# Patient Record
Sex: MALE | Race: WHITE | Employment: OTHER | URBAN - METROPOLITAN AREA
[De-identification: names, ages, dates, MRNs, and addresses within clinical notes are randomized per-mention and may not be internally consistent; named-entity substitution may affect disease eponyms.]

---

## 2017-01-14 RX ORDER — ATORVASTATIN CALCIUM 80 MG/1
TABLET, FILM COATED ORAL
Qty: 90 TAB | Refills: 3 | Status: SHIPPED | OUTPATIENT
Start: 2017-01-14 | End: 2018-03-14 | Stop reason: SDUPTHER

## 2017-01-31 ENCOUNTER — OFFICE VISIT (OUTPATIENT)
Dept: CARDIOLOGY CLINIC | Age: 77
End: 2017-01-31

## 2017-01-31 VITALS
SYSTOLIC BLOOD PRESSURE: 130 MMHG | DIASTOLIC BLOOD PRESSURE: 64 MMHG | HEART RATE: 48 BPM | WEIGHT: 167 LBS | HEIGHT: 74 IN | OXYGEN SATURATION: 97 % | BODY MASS INDEX: 21.43 KG/M2

## 2017-01-31 DIAGNOSIS — E78.5 DYSLIPIDEMIA: ICD-10-CM

## 2017-01-31 DIAGNOSIS — G45.1 HEMISPHERIC CAROTID ARTERY SYNDROME: ICD-10-CM

## 2017-01-31 DIAGNOSIS — I99.9 VASCULAR DISEASE: ICD-10-CM

## 2017-01-31 DIAGNOSIS — I25.10 ATHEROSCLEROSIS OF NATIVE CORONARY ARTERY OF NATIVE HEART WITHOUT ANGINA PECTORIS: ICD-10-CM

## 2017-01-31 DIAGNOSIS — I49.1 PAC (PREMATURE ATRIAL CONTRACTION): ICD-10-CM

## 2017-01-31 DIAGNOSIS — R00.1 SINUS BRADYCARDIA, PERSISTENT: Primary | ICD-10-CM

## 2017-01-31 NOTE — MR AVS SNAPSHOT
Visit Information Date & Time Provider Department Dept. Phone Encounter #  
 1/31/2017 10:20 AM Steve Longoria DO [facility] [id number] Follow-up Instructions Return in about 6 months (around 7/31/2017), or if symptoms worsen or fail to improve. Upcoming Health Maintenance Date Due DTaP/Tdap/Td series (1 - Tdap) 2/7/1960 ZOSTER VACCINE AGE 60> 2/7/1999 GLAUCOMA SCREENING Q2Y 2/7/2004 Pneumococcal 65+ High/Highest Risk (1 of 2 - PCV13) 2/7/2004 MEDICARE YEARLY EXAM 2/7/2004 INFLUENZA AGE 9 TO ADULT 8/1/2016 Allergies as of 1/31/2017  Review Complete On: 1/31/2017 By: Steve Longoria DO No Known Allergies Current Immunizations  Never Reviewed No immunizations on file. Not reviewed this visit You Were Diagnosed With   
  
 Codes Comments Sinus bradycardia, persistent (HCC)    -  Primary ICD-10-CM: R00.1 ICD-9-CM: 427.81 Dyslipidemia     ICD-10-CM: E78.5 ICD-9-CM: 272.4 Atherosclerosis of native coronary artery of native heart without angina pectoris     ICD-10-CM: I25.10 ICD-9-CM: 414.01   
 PAC (premature atrial contraction)     ICD-10-CM: I49.1 ICD-9-CM: 427.61 Hemispheric carotid artery syndrome     ICD-10-CM: G45.1 ICD-9-CM: 435.8 Vascular disease     ICD-10-CM: I99.9 ICD-9-CM: 459.9 Vitals BP Pulse Height(growth percentile) Weight(growth percentile) SpO2 BMI  
 130/64 (!) 48 6' 2\" (1.88 m) 167 lb (75.8 kg) 97% 21.44 kg/m2 Smoking Status Never Smoker Vitals History BMI and BSA Data Body Mass Index Body Surface Area  
 21.44 kg/m 2 1.99 m 2 Preferred Pharmacy Pharmacy Name Phone [address], [address] [place] [address] [phone] Your Updated Medication List  
  
   
This list is accurate as of: 1/31/17 10:48 AM.  Always use your most recent med list.  
  
  
  
  
 aspirin delayed-release 81 mg tablet Take 1 Tab by mouth daily. atorvastatin 80 mg tablet Commonly known as:  LIPITOR  
TAKE 1 TABLET BY MOUTH ONCE DAILY  
  
 cholecalciferol (VITAMIN D3) 5,000 unit Tab tablet Commonly known as:  VITAMIN D3 Take 1 Tab by mouth daily. co-enzyme Q-10 100 mg capsule Commonly known as:  CO Q-10 Take 100 mg by mouth daily. HYDROcodone-acetaminophen 7.5-325 mg per tablet Commonly known as:  Birda Monona Take 1 Tab by mouth every four (4) hours as needed. Max Daily Amount: 6 Tabs. Indications: PAIN  
  
 hydrocortisone 2.5 % topical cream  
Commonly known as:  HYTONE Apply  to affected area two (2) times daily as needed. ketoconazole 2 % topical cream  
Commonly known as:  NIZORAL Apply  to affected area daily. losartan 25 mg tablet Commonly known as:  COZAAR Take 25 mg by mouth daily. * nitroglycerin 0.4 mg SL tablet Commonly known as:  NITROSTAT  
0.4 mg by SubLINGual route every five (5) minutes as needed. * nitroglycerin 0.4 mg SL tablet Commonly known as:  NITROSTAT  
1 Tab by SubLINGual route as needed for Chest Pain (may repeat in 5 minutes time 2 if pain continues. ). VITAMIN B-12 1,000 mcg tablet Generic drug:  cyanocobalamin Take 1,000 mcg by mouth daily. VITAMIN C 500 mg tablet Generic drug:  ascorbic acid (vitamin C) Take 500 mg by mouth daily. * Notice: This list has 2 medication(s) that are the same as other medications prescribed for you. Read the directions carefully, and ask your doctor or other care provider to review them with you. We Performed the Following AMB POC EKG ROUTINE W/ 12 LEADS, INTER & REP [72316 CPT(R)] Follow-up Instructions Return in about 6 months (around 7/31/2017), or if symptoms worsen or fail to improve. Introducing hospitals & HEALTH SERVICES!    
 [facility] introduces [employer] patient portal. Now you can access parts of your medical record, email your doctor's office, and request medication refills online. 1. In your internet browser, go to [email or web address] 2. Click on the First Time User? Click Here link in the Sign In box. You will see the New Member Sign Up page. 3. Enter your Tiggly Access Code exactly as it appears below. You will not need to use this code after youve completed the sign-up process. If you do not sign up before the expiration date, you must request a new code. · Tiggly Access Code: [email or web address] Expires: 2/25/2017  1:14 PM 
 
4. Enter the last four digits of your Social Security Number (xxxx) and Date of Birth (mm/dd/yyyy) as indicated and click Submit. You will be taken to the next sign-up page. 5. Create a Tiggly ID. This will be your Tiggly login ID and cannot be changed, so think of one that is secure and easy to remember. 6. Create a Tiggly password. You can change your password at any time. 7. Enter your Password Reset Question and Answer. This can be used at a later time if you forget your password. 8. Enter your e-mail address. You will receive e-mail notification when new information is available in [address] 9. Click Sign Up. You can now view and download portions of your medical record. 10. Click the Download Summary menu link to download a portable copy of your medical information. If you have questions, please visit the Frequently Asked Questions section of the Tiggly website. Remember, Tiggly is NOT to be used for urgent needs. For medical emergencies, dial 911. Now available from your iPhone and Android! Please provide this summary of care documentation to your next provider. Your primary care clinician is listed as HANNAH LACY. If you have any questions after today's visit, please call [phone].

## 2017-01-31 NOTE — PROGRESS NOTES
1. Have you been to the ER, urgent care clinic since your last visit? Hospitalized since your last visit? no    2. Have you seen or consulted any other health care providers outside of the [address] [place] [address] since your last visit? Include any pap smears or colon screening.  no

## 2017-01-31 NOTE — PROGRESS NOTES
HPI: I saw Shagufta Francois Sr., in my office today in cardiovascular evaluation regarding his underlying ischemic heart disease, carotid disease, and hypercholesterolemia. Mr. Martha Mares is a very pleasant 67year old white male with history of coronary artery disease, status post an inferior wall myocardial infarction back in 1998. He had a cardiac catheterization following a positive nuclear myocardial perfusion study back in 2002 and subsequent cardiac catheterization demonstrating a 75% proximal LAD lesion with a 35% circumflex obstruction, 45% OM1 obstruction and a 70% right coronary artery obstruction with ejection fraction of 70%. He has been treated medically over the years for this problem and has done quite well. He did have a TIA due to a totally occluded left carotid back in February of 2005 and we have been following his carotids every several months and his right carotid artery has always remained less than 50% narrowed. He comes into the office today and relates that he is doing very well. He did have a problem with chest pain back in November but it turned out to be related to gallbladder disease with gallstones and he had his gallbladder removed by Dr. Linda Garcia on November 30, 2016 and has done well since that time. Encounter Diagnoses   Name Primary?  Sinus bradycardia, persistent on low dose beta blockade Yes    Dyslipidemia     Atherosclerosis of native coronary artery of native heart without angina pectoris     PAC (premature atrial contraction)     Hemispheric carotid artery syndrome     Vascular disease        Discussion: This gentleman appears to be doing about as well as we could expect and I really have no recommendations for change at this time. He is not having any symptoms to suggest the development of symptomatic obstructive coronary disease or problems with heart failure symptomatology.      His latest lipid profile which was completed on January 24, 2017 showed total cholesterol 122 with triglycerides of 65, HDL of 60, VLDL of 13, and LDL of 49 which I think is excellent control of his cholesterol on Lipitor 80 mg daily. He does have a sinus bradycardia, but is no longer on beta blockade and simply taking Cozaar 25 mg daily for his blood pressure which seems to be well-controlled today and since he is otherwise doing well I will simply plan to see him again in several months or as needed if any new cardiovascular symptoms surface in the interim. PCP:  Fidelia Grace MD       Past Medical History   Diagnosis Date    Atherosclerotic heart disease     Blurred vision, left eye      resolution with cataract surgery    CAD (coronary artery disease)     Calculus of kidney     Carotid duplex ([facility] 12/17/2015     Mild < 50% JAMAL stenosis. Chronic LICA occlusion. Probable significant RECA stenosis. Unchanged from study of 2/9/15.  Carotid stenosis     Chronic kidney disease, stage IV (severe) ([address] 12/11/2012     BUN and Creatinine 28/2.58     CVA (cerebral vascular accident) ([facility] [address] 2/22/05 & 2/23/05     secondary to totally occluded left internal carotid artery    Equivocal nuclear cardiac imaging test 12/29/2015     Low-intermediate risk. Inferobasal artifact vs prior injury. Possible mild inferobasal hypk. EF 53%. Positive EKG on EST. Ex time 8 mins 43 secs.  History of echocardiogram 02/23/2005     EF 50-55%. Basal-mid inferior hypk. Mild MR.    History of heart attack     Holter monitor, normal 05/20/2014     Benign 24-hour Holter study.  Hypercholesteremia     Ischemic heart disease     Myocardial infarction, inferior wall ([address] 1998    S/P cardiac catheterization 09/12/2002     LM patent. pLAD 75%. pCx 35%. OM1 45%. dRCA 70%. EF 50%.  Stroke [facility]) 2/2005    Vascular disease     Visceral arterial duplex 12/21/2012     No significant renal artery stenosis bilaterally. R kidney 10.4 cm. L kidney 10.1 cm.   Bilateral intrinsic/med renal disease. Both renal veins patent.  Vitamin B 12 deficiency      with history of replacement         Past Surgical History   Procedure Laterality Date    Hx coronary stent placement       right coronary artery    Hx heart catheterization  2002    Hx hernia repair      Hx back surgery       L5    Pr spine surgery procedure unlisted       spinal fusion    Hx appendectomy      Hx cataract removal  2009     left    Hx cataract removal  2012     right         Current Outpatient Rx   Name  Route  Sig  Dispense  Refill    ketoconazole (NIZORAL) 2 % topical cream    Topical    Apply  to affected area daily.  hydrocortisone (HYTONE) 2.5 % topical cream    Topical    Apply  to affected area two (2) times daily as needed.  clopidogrel (PLAVIX) 75 mg tablet    Oral    Take 1 Tab by mouth daily. 30 Tab    6       This is in place of the Aggrenox      atorvastatin (LIPITOR) 80 mg tablet    Oral    Take 1 Tab by mouth daily. 90 Tab    3      tadalafil (CIALIS) 5 mg tablet    Oral    Take 5 mg by mouth as needed. 30 Tab    0      cyanocobalamin (VITAMIN B-12) 1,000 mcg tablet    Oral    Take 1,000 mcg by mouth daily.  Cholecalciferol, Vitamin D3, 5,000 unit Tab    Oral    Take 1 Tab by mouth daily.  losartan (COZAAR) 25 mg tablet    Oral    Take 25 mg by mouth daily.  co-enzyme Q-10 (CO Q-10) 100 mg capsule    Oral    Take 100 mg by mouth daily.  nitroglycerin (NITROSTAT) 0.4 mg SL tablet    SubLINGual    0.4 mg by SubLINGual route every five (5) minutes as needed.  ascorbic acid (VITAMIN C) 500 mg tablet    Oral    Take 500 mg by mouth daily.                  No Known Allergies    Social   Social History   Substance Use Topics    Smoking status: Never Smoker    Smokeless tobacco: Never Used    Alcohol use No         Family history: Father  of myocardial infarction at age 66, otherwise no family history of heart disease. Review of Systems:   Constitutional: Negative. Respiratory: Negative. Cardiovascular: Negative. Gastrointestinal: Negative. Musculoskeletal: Negative. Physical Exam:   The patient is an alert, oriented, well developed, well nourished 67 y.o.  male who was in no acute distress at the time of my examination. Visit Vitals    Ht 6' 2\" (1.88 m)      BP Readings from Last 3 Encounters:   11/30/16 159/73   11/27/16 167/74   07/12/16 126/78        Wt Readings from Last 3 Encounters:   11/30/16 72.1 kg (159 lb)   11/26/16 77.1 kg (170 lb)   07/12/16 76.2 kg (168 lb)       HEENT:  Conjuctiva white, mucosa moist, no pallor or cyanosis. Neck: Supple without masses, tenderness or thyromegaly. No jugular venous distention. Carotid upstroke is decreased on the left without bruits bilaterally. Cardiovascular: Chest is symmetrical with good excursion. [place] is not displaced. No lifts, heaves or thrills. S1 and S2 are normal, without appreciable murmurs, rubs, clicks or gallops. Lungs: Clear to auscultation in all fields. Abdomen: Soft. No masses, tenderness or organomegaly. Extremities: No edema, with full peripheral pulses. Review of Data:  Please refer to past medical history for most recent cardiac testing. Results for orders placed or performed in visit on 07/12/16   AMB POC EKG ROUTINE W/ 12 LEADS, INTER & REP     Status: None    Narrative    Sinus bradycardia, rate 57. Left ventricular hypertrophy by  precordial voltage criteria. This EKG is similar to EKG of  December 15, 2015. Charanjit Landry D.O., F.A.C.C. [facility]  [facility] [place]  [address] [place] [address]. [address]    PLEASE NOTE:  This document has been produced using voice recognition software. Unrecognized errors in transcription may be present.

## 2017-07-31 ENCOUNTER — OFFICE VISIT (OUTPATIENT)
Dept: CARDIOLOGY CLINIC | Age: 77
End: 2017-07-31

## 2017-07-31 VITALS
HEIGHT: 74 IN | DIASTOLIC BLOOD PRESSURE: 80 MMHG | SYSTOLIC BLOOD PRESSURE: 160 MMHG | OXYGEN SATURATION: 98 % | WEIGHT: 166 LBS | BODY MASS INDEX: 21.3 KG/M2 | HEART RATE: 59 BPM

## 2017-07-31 DIAGNOSIS — G45.1 HEMISPHERIC CAROTID ARTERY SYNDROME: ICD-10-CM

## 2017-07-31 DIAGNOSIS — I25.10 ATHEROSCLEROSIS OF NATIVE CORONARY ARTERY OF NATIVE HEART WITHOUT ANGINA PECTORIS: Primary | ICD-10-CM

## 2017-07-31 DIAGNOSIS — E78.5 DYSLIPIDEMIA: ICD-10-CM

## 2017-07-31 RX ORDER — CLOPIDOGREL BISULFATE 75 MG/1
TABLET ORAL
COMMUNITY
End: 2017-09-01 | Stop reason: SDUPTHER

## 2017-07-31 NOTE — PROGRESS NOTES
1. Have you been to the ER, urgent care clinic since your last visit? Hospitalized since your last visit? No     2. Have you seen or consulted any other health care providers outside of the [address] [place] [address] since your last visit? Include any pap smears or colon screening.  No

## 2017-07-31 NOTE — PROGRESS NOTES
HPI:  I saw Patria Khanna Sr., in my office today in cardiovascular evaluation regarding his underlying ischemic heart disease, carotid disease, and hypercholesterolemia. Mr. Zoila Sykes is a very pleasant 65year old white male with history of coronary artery disease, status post an inferior wall myocardial infarction back in 1998. He had a cardiac catheterization following a positive nuclear myocardial perfusion study back in 2002 and subsequent cardiac catheterization demonstrating a 75% proximal LAD lesion with a 35% circumflex obstruction, 45% OM1 obstruction and a 70% right coronary artery obstruction with ejection fraction of 70%. We last did a screening nuclear myocardial perfusion study on him on September 29, 2015 during which the patient exercised for 8 minutes and 43 seconds of the standard Gen protocol and had a positive test later cardiographically with 1-2 mm of upsloping inferolateral ST depression, but he was shown only to have a fixed defect in the inferobasilar wall which could be related to diaphragmatic attenuation although some infarction without ischemia could not be excluded particularly in view of the fact that he had some mild inferobasilar hypokinesia on gated SPECT imaging with a low normal ejection fraction of 53%. Since this area showed just infarction without ischemia we decided to continue medical therapy     He did have a TIA due to a totally occluded left carotid back in February of 2005 and we have been following his carotids every several months and his right carotid artery has always remained less than 50% narrowed. He comes in today relates that he is doing very well he is actually back to working full-time 40 hours a week and denies any cardiovascular symptomatology at this time. Encounter Diagnoses   Name Primary?     Sinus bradycardia, persistent on low dose beta blockade     Dyslipidemia     Atherosclerosis of native coronary artery of native heart without angina pectoris Yes    PAC (premature atrial contraction)     Hemispheric carotid artery syndrome        Discussion: This patient appears to be doing about as well as we could expect that I have no recommendations for change at this time. He is not having any symptoms that she just the development of symptomatic obstructive coronary artery disease or heart failure. His latest lipid profile which was completed on January 24, 2017 showed total cholesterol of 122 with triglycerides of 65, HDL of 60, VLDL of 13, and LDL of 49 which I think is very good control on Lipitor 80 mg daily. His blood pressure is a little bit elevated today and I rechecked it myself and got a somewhat higher blood pressure 160/80 so I have encouraged the patient to get his blood pressure checked a few times in the next few weeks and his blood pressure staying above 076 systolic he should follow-up with Dr. Venita Ramirez for further adjustment of his blood pressure medications moving forward. Since he is otherwise doing well I am simply going to plan to see him again in several months, but we will plan to check carotid Dopplers on him again in December of this year and continue to follow that closely in view of his totally obstructed left internal carotid artery. PCP:  Flor Gonzalez MD       Past Medical History:   Diagnosis Date    Atherosclerotic heart disease     Blurred vision, left eye     resolution with cataract surgery    CAD (coronary artery disease)     Calculus of kidney     Carotid duplex ([address] 12/17/2015    Mild < 50% JAMAL stenosis. Chronic LICA occlusion. Probable significant RECA stenosis. Unchanged from study of 2/9/15.     Carotid stenosis     Chronic kidney disease, stage IV (severe) ([address] 12/11/2012    BUN and Creatinine 28/2.58     CVA (cerebral vascular accident) ([address] 2/22/05 & 2/23/05    secondary to totally occluded left internal carotid artery    Equivocal nuclear cardiac imaging test 12/29/2015 Low-intermediate risk. Inferobasal artifact vs prior injury. Possible mild inferobasal hypk. EF 53%. Positive EKG on EST. Ex time 8 mins 43 secs.  History of echocardiogram 02/23/2005    EF 50-55%. Basal-mid inferior hypk. Mild MR.    History of heart attack     Holter monitor, normal 05/20/2014    Benign 24-hour Holter study.  Hypercholesteremia     Ischemic heart disease     Myocardial infarction, inferior wall ([address] 1998    S/P cardiac catheterization 09/12/2002    LM patent. pLAD 75%. pCx 35%. OM1 45%. dRCA 70%. EF 50%.  Stroke [facility]) 2/2005    Vascular disease     Visceral arterial duplex 12/21/2012    No significant renal artery stenosis bilaterally. R kidney 10.4 cm. L kidney 10.1 cm. Bilateral intrinsic/med renal disease. Both renal veins patent.  Vitamin B 12 deficiency     with history of replacement         Past Surgical History:   Procedure Laterality Date    HX APPENDECTOMY      HX BACK SURGERY      L5    HX CATARACT REMOVAL  9/2009    left    HX CATARACT REMOVAL  1/4/2012    right    [address]    right coronary artery    HX HEART CATHETERIZATION  9/2002    HX HERNIA REPAIR      SPINE SURGERY PROCEDURE UNLISTED      spinal fusion         Current Outpatient Rx   Name  Route  Sig  Dispense  Refill    ketoconazole (NIZORAL) 2 % topical cream    Topical    Apply  to affected area daily.  hydrocortisone (HYTONE) 2.5 % topical cream    Topical    Apply  to affected area two (2) times daily as needed.  clopidogrel (PLAVIX) 75 mg tablet    Oral    Take 1 Tab by mouth daily. 30 Tab    6       This is in place of the Aggrenox      atorvastatin (LIPITOR) 80 mg tablet    Oral    Take 1 Tab by mouth daily. 90 Tab    3      tadalafil (CIALIS) 5 mg tablet    Oral    Take 5 mg by mouth as needed. 30 Tab    0      cyanocobalamin (VITAMIN B-12) 1,000 mcg tablet    Oral    Take 1,000 mcg by mouth daily.                 Cholecalciferol, Vitamin D3, 5,000 unit Tab    Oral    Take 1 Tab by mouth daily.  losartan (COZAAR) 25 mg tablet    Oral    Take 25 mg by mouth daily.  co-enzyme Q-10 (CO Q-10) 100 mg capsule    Oral    Take 100 mg by mouth daily.  nitroglycerin (NITROSTAT) 0.4 mg SL tablet    SubLINGual    0.4 mg by SubLINGual route every five (5) minutes as needed.  ascorbic acid (VITAMIN C) 500 mg tablet    Oral    Take 500 mg by mouth daily. No Known Allergies    Social   Social History   Substance Use Topics    Smoking status: Never Smoker    Smokeless tobacco: Never Used    Alcohol use No         Family history: Father  of myocardial infarction at age 66, otherwise no family history of heart disease. Review of Systems:   Constitutional: Negative. Respiratory: Negative. Cardiovascular: Negative. Gastrointestinal: Negative. Musculoskeletal: Negative. Physical Exam:   The patient is an alert, oriented, well developed, well nourished 65 y.o.  male who was in no acute distress at the time of my examination. Visit Vitals    /72    Pulse (!) 59    Ht 6' 2\" (1.88 m)    Wt 75.3 kg (166 lb)    SpO2 98%    BMI 21.31 kg/m2      BP Readings from Last 3 Encounters:   17 144/72   17 130/64   16 159/73        Wt Readings from Last 3 Encounters:   17 75.3 kg (166 lb)   17 75.8 kg (167 lb)   16 72.1 kg (159 lb)       HEENT:  Conjuctiva white, mucosa moist, no pallor or cyanosis. Neck: Supple without masses, tenderness or thyromegaly. No jugular venous distention. Carotid upstroke is decreased on the left without bruits bilaterally. Cardiovascular: Chest is symmetrical with good excursion. [place] is not displaced. No lifts, heaves or thrills. S1 and S2 are normal, without appreciable murmurs, rubs, clicks or gallops. Lungs: Clear to auscultation in all fields.    Abdomen: Soft.  No masses, tenderness or organomegaly. Extremities: No edema, with full peripheral pulses. Review of Data:  Please refer to past medical history for most recent cardiac testing. Results for orders placed or performed in visit on 01/31/17   AMB POC EKG ROUTINE W/ 12 LEADS, INTER & REP     Status: None    Narrative    Sinus bradycardia, rate 48. This EKG is within normal limits except for slow rate and similar to the EKG of July 12, 2016         Dinorah Miller D.O., F.A.C.C. [facility]  [facility] [place]  [address] [place] [address]. [address]    PLEASE NOTE:  This document has been produced using voice recognition software. Unrecognized errors in transcription may be present.

## 2017-09-01 RX ORDER — CLOPIDOGREL BISULFATE 75 MG/1
TABLET ORAL
Qty: 90 TAB | Refills: 3 | Status: SHIPPED | OUTPATIENT
Start: 2017-09-01 | End: 2018-09-01 | Stop reason: SDUPTHER

## 2018-02-16 ENCOUNTER — OFFICE VISIT (OUTPATIENT)
Dept: CARDIOLOGY CLINIC | Age: 78
End: 2018-02-16

## 2018-02-16 VITALS
HEIGHT: 74 IN | HEART RATE: 56 BPM | DIASTOLIC BLOOD PRESSURE: 60 MMHG | SYSTOLIC BLOOD PRESSURE: 110 MMHG | BODY MASS INDEX: 21.56 KG/M2 | OXYGEN SATURATION: 97 % | WEIGHT: 168 LBS

## 2018-02-16 DIAGNOSIS — E78.5 DYSLIPIDEMIA: ICD-10-CM

## 2018-02-16 DIAGNOSIS — G45.1 HEMISPHERIC CAROTID ARTERY SYNDROME: ICD-10-CM

## 2018-02-16 DIAGNOSIS — I25.10 ATHEROSCLEROSIS OF NATIVE CORONARY ARTERY OF NATIVE HEART WITHOUT ANGINA PECTORIS: Primary | ICD-10-CM

## 2018-02-16 DIAGNOSIS — R09.89 BRUIT: ICD-10-CM

## 2018-02-16 DIAGNOSIS — I65.29 STENOSIS OF CAROTID ARTERY, UNSPECIFIED LATERALITY: ICD-10-CM

## 2018-02-16 DIAGNOSIS — R00.1 SINUS BRADYCARDIA, PERSISTENT: ICD-10-CM

## 2018-02-16 NOTE — MR AVS SNAPSHOT
[address] [address] [address] [address] [phone] Patient: Alex Mckeon Sr. MRN: [id number] Visit Information Date & Time Provider Department Dept. Phone Encounter #  
 2/16/2018  8:20 AM Denilson Jacobson DO [facility] [id number] Follow-up Instructions Return in about 6 months (around 8/16/2018). Upcoming Health Maintenance Date Due DTaP/Tdap/Td series (1 - Tdap) 2/7/1960 ZOSTER VACCINE AGE 60> 12/7/1998 GLAUCOMA SCREENING Q2Y 2/7/2004 Pneumococcal 65+ Low/Medium Risk (1 of 2 - PCV13) 2/7/2004 MEDICARE YEARLY EXAM 2/7/2004 Influenza Age 5 to Adult 8/1/2017 Allergies as of 2/16/2018  Review Complete On: 2/16/2018 By: Denilson Jacobson DO No Known Allergies Current Immunizations  Never Reviewed No immunizations on file. Not reviewed this visit You Were Diagnosed With   
  
 Codes Comments Atherosclerosis of native coronary artery of native heart without angina pectoris    -  Primary ICD-10-CM: I25.10 ICD-9-CM: 414.01 Sinus bradycardia, persistent     ICD-10-CM: R00.1 ICD-9-CM: 427.81 Dyslipidemia     ICD-10-CM: E78.5 ICD-9-CM: 272.4 Hemispheric carotid artery syndrome     ICD-10-CM: G45.1 ICD-9-CM: 435.8 Bruit     ICD-10-CM: R09.89 ICD-9-CM: 422. 9 Stenosis of carotid artery, unspecified laterality     ICD-10-CM: I65.29 ICD-9-CM: 433.10 Vitals BP Pulse Height(growth percentile) Weight(growth percentile) SpO2 BMI  
 110/60 (!) 56 6' 2\" (1.88 m) 168 lb (76.2 kg) 97% 21.57 kg/m2 Smoking Status Never Smoker Vitals History BMI and BSA Data Body Mass Index Body Surface Area  
 21.57 kg/m 2 1.99 m 2 Preferred Pharmacy Pharmacy Name Phone [address], [address] [place] [address] [phone] Your Updated Medication List  
  
   
This list is accurate as of: 2/16/18  8:52 AM.  Always use your most recent med list.  
  
  
  
  
 aspirin delayed-release 81 mg tablet Take 1 Tab by mouth daily. atorvastatin 80 mg tablet Commonly known as:  LIPITOR  
TAKE 1 TABLET BY MOUTH ONCE DAILY  
  
 cholecalciferol (VITAMIN D3) 5,000 unit Tab tablet Commonly known as:  VITAMIN D3 Take 1 Tab by mouth daily. clopidogrel 75 mg Tab Commonly known as:  PLAVIX TAKE 1 TABLET BY MOUTH ONCE DAILY co-enzyme Q-10 100 mg capsule Commonly known as:  CO Q-10 Take 100 mg by mouth daily. hydrocortisone 2.5 % topical cream  
Commonly known as:  HYTONE Apply  to affected area two (2) times daily as needed. ketoconazole 2 % topical cream  
Commonly known as:  NIZORAL Apply  to affected area daily. losartan 25 mg tablet Commonly known as:  COZAAR Take 25 mg by mouth daily. * nitroglycerin 0.4 mg SL tablet Commonly known as:  NITROSTAT  
0.4 mg by SubLINGual route every five (5) minutes as needed. * nitroglycerin 0.4 mg SL tablet Commonly known as:  NITROSTAT  
1 Tab by SubLINGual route as needed for Chest Pain (may repeat in 5 minutes time 2 if pain continues. ). VITAMIN B-12 1,000 mcg tablet Generic drug:  cyanocobalamin Take 1,000 mcg by mouth daily. VITAMIN C 500 mg tablet Generic drug:  ascorbic acid (vitamin C) Take 500 mg by mouth daily. * Notice: This list has 2 medication(s) that are the same as other medications prescribed for you. Read the directions carefully, and ask your doctor or other care provider to review them with you. We Performed the Following AMB POC EKG ROUTINE W/ 12 LEADS, INTER & REP [21209 CPT(R)] Follow-up Instructions Return in about 6 months (around 8/16/2018). To-Do List   
 02/16/2018 ECG:  CARDIAC SPECT REST AND STRESS   
  
 02/16/2018 Imaging:  DUPLEX CAROTID BILATERAL   
  
 02/16/2018 ECG:  NUCLEAR STRESS TEST Introducing [facility]! [facility] introduces Point.io patient portal. Now you can access parts of your medical record, email your doctor's office, and request medication refills online. 1. In your internet browser, go to [email or web address] 2. Click on the First Time User? Click Here link in the Sign In box. You will see the New Member Sign Up page. 3. Enter your Point.io Access Code exactly as it appears below. You will not need to use this code after youve completed the sign-up process. If you do not sign up before the expiration date, you must request a new code. · Point.io Access Code: [email or web address] Expires: 5/17/2018  8:52 AM 
 
4. Enter the last four digits of your Social Security Number (xxxx) and Date of Birth (mm/dd/yyyy) as indicated and click Submit. You will be taken to the next sign-up page. 5. Create a Point.io ID. This will be your Point.io login ID and cannot be changed, so think of one that is secure and easy to remember. 6. Create a Point.io password. You can change your password at any time. 7. Enter your Password Reset Question and Answer. This can be used at a later time if you forget your password. 8. Enter your e-mail address. You will receive e-mail notification when new information is available in [address] 9. Click Sign Up. You can now view and download portions of your medical record. 10. Click the Download Summary menu link to download a portable copy of your medical information. If you have questions, please visit the Frequently Asked Questions section of the Point.io website. Remember, Point.io is NOT to be used for urgent needs. For medical emergencies, dial 911. Now available from your iPhone and Android! Please provide this summary of care documentation to your next provider. Your primary care clinician is listed as Winnie Gilman. If you have any questions after today's visit, please call [phone].

## 2018-02-16 NOTE — PROGRESS NOTES
HPI:   I saw Phyllis Duarte Sr., in my office today in cardiovascular evaluation regarding his underlying ischemic heart disease, carotid disease, and hypercholesterolemia. Mr. Reece Epley is a very pleasant 77year old white male with history of coronary artery disease, status post an inferior wall myocardial infarction back in 1998. He had a cardiac catheterization following a positive nuclear myocardial perfusion study back in 2002 and subsequent cardiac catheterization demonstrating a 75% proximal LAD lesion with a 35% circumflex obstruction, 45% OM1 obstruction and a 70% right coronary artery obstruction with ejection fraction of 70%. We last did a screening nuclear myocardial perfusion study on him on September 29, 2015 during which the patient exercised for 8 minutes and 43 seconds of the standard Gen protocol and had a positive test later cardiographically with 1-2 mm of upsloping inferolateral ST depression, but he was shown only to have a fixed defect in the inferobasilar wall which could be related to diaphragmatic attenuation although some infarction without ischemia could not be excluded particularly in view of the fact that he had some mild inferobasilar hypokinesia on gated SPECT imaging with a low normal ejection fraction of 53%. Since this area showed just infarction without ischemia we decided to continue medical therapy      He did have a TIA due to a totally occluded left carotid back in February of 2005 and we have been following his carotids every several months and his right carotid artery has always remained less than 50% narrowed. He comes in today relates that he is doing quite well. He is staying quite active and denies any cardiovascular symptomatology at this time. Encounter Diagnoses   Name Primary?     Atherosclerosis of native coronary artery of native heart without angina pectoris Yes    Sinus bradycardia, persistent on low dose beta blockade     Dyslipidemia     Hemispheric carotid artery syndrome     Bruit, right carotid     Stenosis of carotid artery, total obstruction of left internal carotid in past        Discussion: This gentleman is doing about as well as we could expect and really have no recommendations for change at this time. He is not having any symptoms to suggest the development of symptomatic obstructive coronary artery disease. It has been about 2-1/2 years since we did a screening nuclear myocardial perfusion study on him and I would plan to consider getting one completed prior to his next visit in August 2018. He does have significant carotid disease as described above was to have carotid Dopplers in July but they were not completed for reasons that are not clear so we will get one completed now to be compared to the study of July of 2016. His latest lipid profile was completed, I believe, through Dr. Caio Quick office so I am going to get a copy from him. Historically, his cholesterols have been very well-controlled on Lipitor 80 mg daily. His blood pressure is well-controlled today and his EKG is stable and a mild sinus bradycardia on no AV blocking drugs so I am simply going to plan to see him again in several months or sooner if any new cardiovascular symptoms surface in the interim. PCP:  Uriel Alcazar MD       Past Medical History:   Diagnosis Date    Atherosclerotic heart disease     Blurred vision, left eye     resolution with cataract surgery    CAD (coronary artery disease)     Calculus of kidney     Carotid duplex ([address] 12/17/2015    Mild < 50% JAMAL stenosis. Chronic LICA occlusion. Probable significant RECA stenosis. Unchanged from study of 2/9/15.     Carotid stenosis     Chronic kidney disease, stage IV (severe) ([address] 12/11/2012    BUN and Creatinine 28/2.58     CVA (cerebral vascular accident) ([address] 2/22/05 & 2/23/05    secondary to totally occluded left internal carotid artery    Equivocal nuclear cardiac imaging test 12/29/2015    Low-intermediate risk. Inferobasal artifact vs prior injury. Possible mild inferobasal hypk. EF 53%. Positive EKG on EST. Ex time 8 mins 43 secs.  History of echocardiogram 02/23/2005    EF 50-55%. Basal-mid inferior hypk. Mild MR.    History of heart attack     Holter monitor, normal 05/20/2014    Benign 24-hour Holter study.  Hypercholesteremia     Ischemic heart disease     Myocardial infarction, inferior wall ([address] 1998    S/P cardiac catheterization 09/12/2002    LM patent. pLAD 75%. pCx 35%. OM1 45%. dRCA 70%. EF 50%.  Stroke [facility]) 2/2005    Vascular disease     Visceral arterial duplex 12/21/2012    No significant renal artery stenosis bilaterally. R kidney 10.4 cm. L kidney 10.1 cm. Bilateral intrinsic/med renal disease. Both renal veins patent.  Vitamin B 12 deficiency     with history of replacement         Past Surgical History:   Procedure Laterality Date    HX APPENDECTOMY      HX BACK SURGERY      L5    HX CATARACT REMOVAL  9/2009    left    HX CATARACT REMOVAL  1/4/2012    right    [address]    right coronary artery    HX HEART CATHETERIZATION  9/2002    HX HERNIA REPAIR      SPINE SURGERY PROCEDURE UNLISTED      spinal fusion         Current Outpatient Rx   Name  Route  Sig  Dispense  Refill    ketoconazole (NIZORAL) 2 % topical cream    Topical    Apply  to affected area daily.  hydrocortisone (HYTONE) 2.5 % topical cream    Topical    Apply  to affected area two (2) times daily as needed.  clopidogrel (PLAVIX) 75 mg tablet    Oral    Take 1 Tab by mouth daily. 30 Tab    6       This is in place of the Aggrenox      atorvastatin (LIPITOR) 80 mg tablet    Oral    Take 1 Tab by mouth daily. 90 Tab    3      tadalafil (CIALIS) 5 mg tablet    Oral    Take 5 mg by mouth as needed.     30 Tab    0      cyanocobalamin (VITAMIN B-12) 1,000 mcg tablet    Oral    Take 1,000 mcg by mouth daily.                Cholecalciferol, Vitamin D3, 5,000 unit Tab    Oral    Take 1 Tab by mouth daily.  losartan (COZAAR) 25 mg tablet    Oral    Take 25 mg by mouth daily.  co-enzyme Q-10 (CO Q-10) 100 mg capsule    Oral    Take 100 mg by mouth daily.  nitroglycerin (NITROSTAT) 0.4 mg SL tablet    SubLINGual    0.4 mg by SubLINGual route every five (5) minutes as needed.  ascorbic acid (VITAMIN C) 500 mg tablet    Oral    Take 500 mg by mouth daily. No Known Allergies    Social   Social History   Substance Use Topics    Smoking status: Never Smoker    Smokeless tobacco: Never Used    Alcohol use No         Family history: Father  of myocardial infarction at age 66, otherwise no family history of heart disease. Review of Systems:   Constitutional: Negative. Respiratory: Negative. Cardiovascular: Negative. Gastrointestinal: Negative. Musculoskeletal: Negative. Physical Exam:   The patient is an alert, oriented, well developed, well nourished 77 y.o.  male who was in no acute distress at the time of my examination. Visit Vitals    /60    Pulse (!) 56    Ht 6' 2\" (1.88 m)    Wt 76.2 kg (168 lb)    SpO2 97%    BMI 21.57 kg/m2      BP Readings from Last 3 Encounters:   18 110/60   17 160/80   17 130/64        Wt Readings from Last 3 Encounters:   18 76.2 kg (168 lb)   17 75.3 kg (166 lb)   17 75.8 kg (167 lb)       HEENT:  Conjuctiva white, mucosa moist, no pallor or cyanosis. Neck: Supple without masses, tenderness or thyromegaly. No jugular venous distention. Carotid upstroke is decreased on the left without bruit on the left with a moderate pitched bruit over the right carotid. Cardiovascular: Chest is symmetrical with good excursion. [place] is not displaced. No lifts, heaves or thrills.   S1 and S2 are normal, without appreciable murmurs, rubs, clicks or gallops. Lungs: Clear to auscultation in all fields. Abdomen: Soft. No masses, tenderness or organomegaly. Extremities: No edema, with full peripheral pulses. Review of Data:  Please refer to past medical history for most recent cardiac testing. Results for orders placed or performed in visit on 02/16/18   AMB POC EKG ROUTINE W/ 12 LEADS, INTER & REP     Status: None    Narrative    Sinus bradycardia, rate 56. First-degree AV block. This EKG is otherwise within normal limits and similar to the EKG of July 31, 2017. Shaina Connell D.O., F.A.C.C. [facility]  [facility] [place]  [place] [address]. [address] [place]    PLEASE NOTE:  This document has been produced using voice recognition software. Unrecognized errors in transcription may be present.

## 2018-02-16 NOTE — PROGRESS NOTES
1. Have you been to the ER, urgent care clinic since your last visit? Hospitalized since your last visit? No     2. Have you seen or consulted any other health care providers outside of the [address] [place] [address] since your last visit? Include any pap smears or colon screening.  No

## 2018-02-22 ENCOUNTER — HOSPITAL ENCOUNTER (OUTPATIENT)
Dept: VASCULAR SURGERY | Age: 78
Discharge: HOME | End: 2018-02-22
Attending: INTERNAL MEDICINE
Payer: MEDICARE

## 2018-02-22 DIAGNOSIS — R09.89 BRUIT: ICD-10-CM

## 2018-02-22 DIAGNOSIS — I65.29 STENOSIS OF CAROTID ARTERY, UNSPECIFIED LATERALITY: ICD-10-CM

## 2018-02-22 PROCEDURE — 93880 EXTRACRANIAL BILAT STUDY: CPT

## 2018-02-22 NOTE — PROCEDURES
\[facility] [place] [facility]\""  *** FINAL REPORT ***    Name: Anamaria Aguirre  MRN: [id number]    Outpatient  : 1940  HIS Order #: [id number]  [address] [facility] Visit #: [id number]  Date: 2018    TYPE OF TEST: Cerebrovascular Duplex    REASON FOR TEST    Right Carotid:-             Proximal               Mid                 Distal  cm/s  Systolic  Diastolic  Systolic  Diastolic  Systolic  Diastolic  CCA:    548.2      14.0      112.0      20.0      106.0      23.0  Bulb:  ECA:    448.0  ICA:    193.0      47.0      141.0      28.0      115.0      28.0  ICA/CCA:  1.8       2.0    ICA Stenosis: 50-69%    Right Vertebral:-  Finding: Antegrade  Sys:       80.0  Danika:       21.0    Right Subclavian: Normal    Left Carotid:-            Proximal                Mid                 Distal  cm/s  Systolic  Diastolic  Systolic  Diastolic  Systolic  Diastolic  CCA:     44.6       0.0       98.0       8.0       78.0       0.0  Bulb:  ECA:    131.0  ICA:      0.0                  0.0                  0.0  ICA/CCA:  0.0    ICA Stenosis: Occluded    Left Vertebral:-  Finding: Antegrade  Sys:       76.0  Danika:       21.0    Left Subclavian: Normal    INTERPRETATION/FINDINGS  Duplex images were obtained using 2-D gray scale, color flow, and  spectral Doppler analysis. 1. 50-69% stenosis in the right internal carotid artery. 2. Chronic occlusion of the left internal carotid artery. 3. Probable hemodynamically significant stenosis in the right external   carotid artery. 4. No significant stenosis in the left external carotid artery. 5. Antegrade flow in both vertebral arteries. 6. Normal flow in both subclavian arteries. Plaque Morphology:  1. Hyperechoic plaque in the bulb and right ICA. 2. Hyperechoic plaque in the bulb and left ICA. Retrospective Comparison:  1. There has been a moderate increase in the degree of stenosis in  both internal carotid arteries compared to the previous exam done in  2016.     ADDITIONAL COMMENTS    I have personally reviewed the data relevant to the interpretation of  this  study. TECHNOLOGIST: Annie Javier, [facility], RVT/  Signed: 02/22/2018 02:09 PM    PHYSICIAN: Matthew Hernandez MD  Signed: 02/22/2018 09:17 PM

## 2018-02-23 NOTE — PROGRESS NOTES
Per your last office note, \"This gentleman is doing about as well as we could expect and really have no recommendations for change at this time. He is not having any symptoms to suggest the development of symptomatic obstructive coronary artery disease. It has been about 2-1/2 years since we did a screening nuclear myocardial perfusion study on him and I would plan to consider getting one completed prior to his next visit in August 2018.     He does have significant carotid disease as described above was to have carotid Dopplers in July but they were not completed for reasons that are not clear so we will get one completed now to be compared to the study of July of 2016. \"

## 2018-02-25 NOTE — PROGRESS NOTES
The patient has had some progression of the right internal carotid artery stenosis but it still only moderate and we just need to repeat this in 6 months. Please let the patient know.  ES

## 2018-02-27 ENCOUNTER — TELEPHONE (OUTPATIENT)
Dept: CARDIOLOGY CLINIC | Age: 78
End: 2018-02-27

## 2018-02-27 DIAGNOSIS — I65.22 STENOSIS OF LEFT CAROTID ARTERY: ICD-10-CM

## 2018-02-27 DIAGNOSIS — R09.89 BRUIT OF RIGHT CAROTID ARTERY: Primary | ICD-10-CM

## 2018-02-27 NOTE — TELEPHONE ENCOUNTER
----- Message from Gustavo Collins DO sent at 2/25/2018  3:13 PM EST -----  The patient has had some progression of the right internal carotid artery stenosis but it still only moderate and we just need to repeat this in 6 months. Please let the patient know.  ES

## 2018-02-27 NOTE — TELEPHONE ENCOUNTER
Patient made aware of the below results. Order placed for repeat carotids to be done in 6 months.       Verbal order and read back per Skinny [place], DO

## 2018-03-15 RX ORDER — ATORVASTATIN CALCIUM 80 MG/1
TABLET, FILM COATED ORAL
Qty: 90 TAB | Refills: 3 | Status: SHIPPED | OUTPATIENT
Start: 2018-03-15 | End: 2018-03-19 | Stop reason: SDUPTHER

## 2018-03-19 RX ORDER — ATORVASTATIN CALCIUM 80 MG/1
TABLET, FILM COATED ORAL
Qty: 90 TAB | Refills: 3 | Status: SHIPPED | OUTPATIENT
Start: 2018-03-19 | End: 2019-04-23 | Stop reason: SDUPTHER

## 2018-08-03 ENCOUNTER — APPOINTMENT (OUTPATIENT)
Dept: NON INVASIVE DIAGNOSTICS | Age: 78
End: 2018-08-03
Attending: INTERNAL MEDICINE
Payer: MEDICARE

## 2018-08-03 ENCOUNTER — HOSPITAL ENCOUNTER (OUTPATIENT)
Dept: NON INVASIVE DIAGNOSTICS | Age: 78
Discharge: HOME | End: 2018-08-03
Attending: INTERNAL MEDICINE
Payer: MEDICARE

## 2018-08-03 VITALS
BODY MASS INDEX: 21.56 KG/M2 | SYSTOLIC BLOOD PRESSURE: 140 MMHG | DIASTOLIC BLOOD PRESSURE: 80 MMHG | HEIGHT: 74 IN | WEIGHT: 168 LBS

## 2018-08-03 DIAGNOSIS — I25.10 ATHEROSCLEROSIS OF NATIVE CORONARY ARTERY OF NATIVE HEART WITHOUT ANGINA PECTORIS: ICD-10-CM

## 2018-08-03 LAB
NUC REST EJECTION FRACTION: 57 %
STRESS BASELINE DIAS BP: 80 MMHG
STRESS BASELINE HR: 62 BPM
STRESS BASELINE SYS BP: 140 MMHG
STRESS ESTIMATED WORKLOAD: 1.5 METS
STRESS EXERCISE DUR MIN: NORMAL
STRESS PEAK DIAS BP: 60 MMHG
STRESS PEAK SYS BP: 162 MMHG
STRESS PERCENT HR ACHIEVED: 78 %
STRESS POST PEAK HR: 93 BPM
STRESS RATE PRESSURE PRODUCT: NORMAL BPM*MMHG
STRESS ST DEPRESSION: 0 MM
STRESS ST ELEVATION: 0 MM
STRESS TARGET HR: 120 BPM
TID: 0.86

## 2018-08-03 PROCEDURE — A9500 TC99M SESTAMIBI: HCPCS

## 2018-08-03 PROCEDURE — 74011000258 HC RX REV CODE- 258: Performed by: INTERNAL MEDICINE

## 2018-08-03 PROCEDURE — 74011250636 HC RX REV CODE- 250/636: Performed by: INTERNAL MEDICINE

## 2018-08-03 RX ORDER — SODIUM CHLORIDE 0.9 % (FLUSH) 0.9 %
10 SYRINGE (ML) INJECTION AS NEEDED
Status: COMPLETED | OUTPATIENT
Start: 2018-08-03 | End: 2018-08-03

## 2018-08-03 RX ORDER — SODIUM CHLORIDE 9 MG/ML
100 INJECTION, SOLUTION INTRAVENOUS ONCE
Status: COMPLETED | OUTPATIENT
Start: 2018-08-03 | End: 2018-08-03

## 2018-08-03 RX ADMIN — SODIUM CHLORIDE 100 ML/HR: 900 INJECTION, SOLUTION INTRAVENOUS at 09:15

## 2018-08-03 RX ADMIN — REGADENOSON 0.4 MG: 0.08 INJECTION, SOLUTION INTRAVENOUS at 09:15

## 2018-08-03 RX ADMIN — Medication 10 ML: at 07:25

## 2018-08-03 NOTE — PROGRESS NOTES
Patient was injected with 68.3 millicuries 87MKZ Sestamibi on 8/3/18 at 0725. Patient was injected with 59.7 millicuries 42NVW Sestamibi on 8/3/18 at 0915. Patient's armbands were removed and placed in shred-it box.  
 
Patient had a Nuclear Lexiscan Stress Test.

## 2018-08-07 NOTE — PROGRESS NOTES
Per your last note \" This gentleman is doing about as well as we could expect and really have no recommendations for change at this time. He is not having any symptoms to suggest the development of symptomatic obstructive coronary artery disease. It has been about 2-1/2 years since we did a screening nuclear myocardial perfusion study on him and I would plan to consider getting one completed prior to his next visit in August 2018.

## 2018-08-11 ENCOUNTER — TELEPHONE (OUTPATIENT)
Dept: CARDIOLOGY CLINIC | Age: 78
End: 2018-08-11

## 2018-08-11 NOTE — TELEPHONE ENCOUNTER
----- Message from Sultana Goetz RN sent at 8/7/2018 12:18 PM EDT -----  Per your last note \" This gentleman is doing about as well as we could expect and really have no recommendations for change at this time. He is not having any symptoms to suggest the development of symptomatic obstructive coronary artery disease. It has been about 2-1/2 years since we did a screening nuclear myocardial perfusion study on him and I would plan to consider getting one completed prior to his next visit in August 2018.

## 2018-08-11 NOTE — TELEPHONE ENCOUNTER
This patient's nuclear myocardial perfusion study appeared to be normal with normal perfusion of the heart and normal left ventricular function with ejection fraction 57%. There was a little inferior wall scar which appeared to be artifactual and not related to past heart damage. Please let him know if he has any questions I will be glad to discuss it with him.  ES

## 2018-08-17 ENCOUNTER — OFFICE VISIT (OUTPATIENT)
Dept: CARDIOLOGY CLINIC | Age: 78
End: 2018-08-17

## 2018-08-17 VITALS
HEART RATE: 65 BPM | OXYGEN SATURATION: 97 % | SYSTOLIC BLOOD PRESSURE: 124 MMHG | HEIGHT: 74 IN | DIASTOLIC BLOOD PRESSURE: 66 MMHG | WEIGHT: 161 LBS | BODY MASS INDEX: 20.66 KG/M2

## 2018-08-17 DIAGNOSIS — E78.5 DYSLIPIDEMIA: ICD-10-CM

## 2018-08-17 DIAGNOSIS — R09.89 BRUIT OF RIGHT CAROTID ARTERY: ICD-10-CM

## 2018-08-17 DIAGNOSIS — I25.10 ATHEROSCLEROSIS OF NATIVE CORONARY ARTERY OF NATIVE HEART WITHOUT ANGINA PECTORIS: Primary | ICD-10-CM

## 2018-08-17 NOTE — MR AVS SNAPSHOT
[address] [address] [address] [address] [phone] Patient: Phil Landis Sr. MRN: [id number] DXU:[id number] Visit Information Date & Time Provider Department Dept. Phone Encounter #  
 8/17/2018  8:00 AM Renata Kimball DO [facility] [id number] Follow-up Instructions Return in about 6 months (around 2/17/2019), or if symptoms worsen or fail to improve. Upcoming Health Maintenance Date Due DTaP/Tdap/Td series (1 - Tdap) 2/7/1960 ZOSTER VACCINE AGE 60> 12/7/1998 GLAUCOMA SCREENING Q2Y 2/7/2004 Pneumococcal 65+ Low/Medium Risk (1 of 2 - PCV13) 2/7/2004 MEDICARE YEARLY EXAM 3/14/2018 Influenza Age 5 to Adult 8/1/2018 Allergies as of 8/17/2018  Review Complete On: 8/17/2018 By: Renata Kimball DO No Known Allergies Current Immunizations  Never Reviewed No immunizations on file. Not reviewed this visit You Were Diagnosed With   
  
 Codes Comments Atherosclerosis of native coronary artery of native heart without angina pectoris    -  Primary ICD-10-CM: I25.10 ICD-9-CM: 414.01 Dyslipidemia     ICD-10-CM: E78.5 ICD-9-CM: 272.4 Bruit of right carotid artery     ICD-10-CM: R09.89 ICD-9-CM: 203. 9 Vitals BP Pulse Height(growth percentile) Weight(growth percentile) SpO2 BMI  
 124/66 65 6' 2\" (1.88 m) 161 lb (73 kg) 97% 20.67 kg/m2 Smoking Status Never Smoker Vitals History BMI and BSA Data Body Mass Index Body Surface Area  
 20.67 kg/m 2 1.95 m 2 Preferred Pharmacy Pharmacy Name Phone [address], [address] [place] [address] [phone] Your Updated Medication List  
  
   
This list is accurate as of 8/17/18  8:44 AM.  Always use your most recent med list.  
  
  
  
  
 aspirin delayed-release 81 mg tablet Take 1 Tab by mouth daily. atorvastatin 80 mg tablet Commonly known as:  LIPITOR  
TAKE 1 TABLET BY MOUTH ONCE DAILY  
  
 cholecalciferol (VITAMIN D3) 5,000 unit Tab tablet Commonly known as:  VITAMIN D3 Take 1 Tab by mouth daily. clopidogrel 75 mg Tab Commonly known as:  PLAVIX TAKE 1 TABLET BY MOUTH ONCE DAILY co-enzyme Q-10 100 mg capsule Commonly known as:  CO Q-10 Take 100 mg by mouth daily. hydrocortisone 2.5 % topical cream  
Commonly known as:  HYTONE Apply  to affected area two (2) times daily as needed. ketoconazole 2 % topical cream  
Commonly known as:  NIZORAL Apply  to affected area daily. losartan 25 mg tablet Commonly known as:  COZAAR Take 25 mg by mouth daily. * nitroglycerin 0.4 mg SL tablet Commonly known as:  NITROSTAT  
0.4 mg by SubLINGual route every five (5) minutes as needed. * nitroglycerin 0.4 mg SL tablet Commonly known as:  NITROSTAT  
1 Tab by SubLINGual route as needed for Chest Pain (may repeat in 5 minutes time 2 if pain continues. ). VITAMIN B-12 1,000 mcg tablet Generic drug:  cyanocobalamin Take 1,000 mcg by mouth daily. VITAMIN C 500 mg tablet Generic drug:  ascorbic acid (vitamin C) Take 500 mg by mouth daily. * Notice: This list has 2 medication(s) that are the same as other medications prescribed for you. Read the directions carefully, and ask your doctor or other care provider to review them with you. We Performed the Following AMB POC EKG ROUTINE W/ 12 LEADS, INTER & REP [19670 CPT(R)] Follow-up Instructions Return in about 6 months (around 2/17/2019), or if symptoms worsen or fail to improve. Introducing [facility] [place] [facility]! [facility] introduces [employer] patient portal. Now you can access parts of your medical record, email your doctor's office, and request medication refills online.    
 
1. In your internet browser, go to [email or web address] 2. Click on the First Time User? Click Here link in the Sign In box. You will see the New Member Sign Up page. 3. Enter your [employer] Access Code exactly as it appears below. You will not need to use this code after youve completed the sign-up process. If you do not sign up before the expiration date, you must request a new code. · [employer] Access Code: [email or web address] Expires: 11/1/2018  7:15 AM 
 
4. Enter the last four digits of your Social Security Number (xxxx) and Date of Birth (mm/dd/yyyy) as indicated and click Submit. You will be taken to the next sign-up page. 5. Create a One World Virtualt ID. This will be your [employer] login ID and cannot be changed, so think of one that is secure and easy to remember. 6. Create a [employer] password. You can change your password at any time. 7. Enter your Password Reset Question and Answer. This can be used at a later time if you forget your password. 8. Enter your e-mail address. You will receive e-mail notification when new information is available in [address] 9. Click Sign Up. You can now view and download portions of your medical record. 10. Click the Download Summary menu link to download a portable copy of your medical information. If you have questions, please visit the Frequently Asked Questions section of the [employer] website. Remember, [employer] is NOT to be used for urgent needs. For medical emergencies, dial 911. Now available from your iPhone and Android! Please provide this summary of care documentation to your next provider. Your primary care clinician is listed as [address] [place] [address]. If you have any questions after today's visit, please call [phone].

## 2018-08-17 NOTE — PROGRESS NOTES
HPI:  I saw Keeley Yi Sr., in my office today in cardiovascular evaluation regarding his underlying ischemic heart disease, carotid disease, and hypercholesterolemia. Mr. Lilli Mao is a very pleasant 66 year old white male with history of coronary artery disease, status post an inferior wall myocardial infarction back in 1998. He had a cardiac catheterization following a positive nuclear myocardial perfusion study back in 2002 and subsequent cardiac catheterization demonstrating a 75% proximal LAD lesion with a 35% circumflex obstruction, 45% OM1 obstruction and a 70% right coronary artery obstruction with ejection fraction of 70%. We last did a screening nuclear myocardial perfusion study which is just completed on August 3, 2018 and demonstrated a moderate sized mid to basal and inferoseptal infarct without ischemia and low normal overall left ventricular function with ejection fraction at 57%. This was similar to the study of December 29, 2015 although the LV function was slightly better being 53% at that time. The possibility that the inferior wall abnormality is from diaphragmatic soft tissue attenuation cannot be entirely excluded. This was still felt to be a low risk study.     He comes in today relates that he is doing quite well he is staying fairly active and denies any cardiovascular symptomatology at this time. Encounter Diagnoses   Name Primary?  Atherosclerosis of native coronary artery of native heart without angina pectoris Yes    Dyslipidemia     Bruit of right carotid artery        Discussion: This gentleman appears to be doing about as well as we could expect and really of no recommendations for change at this time.   He is not having any symptoms to suggest the development of symptomatic obstructive coronary disease and his recent nuclear myocardial perfusion study demonstrated what appeared to be scar versus diaphragmatic attenuation with normal overall left ventricular function and without any ischemia suggesting a low risk study. His latest lipid profile which was recently completed on June 19, 2018 was really excellent with a total cholesterol 111, triglycerides of 44, HDL of 56, LDL of 46, and VLDL of 9 which I think is excellent control on Lipitor 80 mg daily. He does have carotid disease and a right carotid bruit which we have been following closely and I am going to repeat carotid Dopplers now which is about 6 months from his last carotid duplex study. His blood pressure is very well-controlled today and his EKG appears to be stable, so I am simply going plan to see him again in several months or as needed if any new cardiovascular symptoms surface in the interim. PCP:  [address], [place]       Past Medical History:   Diagnosis Date    Atherosclerotic heart disease     Blurred vision, left eye     resolution with cataract surgery    CAD (coronary artery disease)     Calculus of kidney     Carotid duplex ([address] 12/17/2015    Mild < 50% JAMAL stenosis. Chronic LICA occlusion. Probable significant RECA stenosis. Unchanged from study of 2/9/15.  Carotid stenosis     Chronic kidney disease, stage IV (severe) ([address] 12/11/2012    BUN and Creatinine 28/2.58     CVA (cerebral vascular accident) ([address] 2/22/05 & 2/23/05    secondary to totally occluded left internal carotid artery    Equivocal nuclear cardiac imaging test 12/29/2015    Low-intermediate risk. Inferobasal artifact vs prior injury. Possible mild inferobasal hypk. EF 53%. Positive EKG on EST. Ex time 8 mins 43 secs.  History of echocardiogram 02/23/2005    EF 50-55%. Basal-mid inferior hypk. Mild MR.    History of heart attack     Holter monitor, normal 05/20/2014    Benign 24-hour Holter study.  Hypercholesteremia     Ischemic heart disease     Myocardial infarction, inferior wall ([address] 1998    S/P cardiac catheterization 09/12/2002    LM patent. pLAD 75%. pCx 35%. OM1 45%. dRCA 70%. EF 50%.  Stroke [facility]) 2/2005    Vascular disease     Visceral arterial duplex 12/21/2012    No significant renal artery stenosis bilaterally. R kidney 10.4 cm. L kidney 10.1 cm. Bilateral intrinsic/med renal disease. Both renal veins patent.  Vitamin B 12 deficiency     with history of replacement         Past Surgical History:   Procedure Laterality Date    HX APPENDECTOMY      HX BACK SURGERY      L5    HX CATARACT REMOVAL  9/2009    left    HX CATARACT REMOVAL  1/4/2012    right    [address]    right coronary artery    HX HEART CATHETERIZATION  9/2002    HX HERNIA REPAIR      SPINE SURGERY PROCEDURE UNLISTED      spinal fusion         Current Outpatient Rx   Name  Route  Sig  Dispense  Refill    ketoconazole (NIZORAL) 2 % topical cream    Topical    Apply  to affected area daily.  hydrocortisone (HYTONE) 2.5 % topical cream    Topical    Apply  to affected area two (2) times daily as needed.  clopidogrel (PLAVIX) 75 mg tablet    Oral    Take 1 Tab by mouth daily. 30 Tab    6       This is in place of the Aggrenox      atorvastatin (LIPITOR) 80 mg tablet    Oral    Take 1 Tab by mouth daily. 90 Tab    3      tadalafil (CIALIS) 5 mg tablet    Oral    Take 5 mg by mouth as needed. 30 Tab    0      cyanocobalamin (VITAMIN B-12) 1,000 mcg tablet    Oral    Take 1,000 mcg by mouth daily.  Cholecalciferol, Vitamin D3, 5,000 unit Tab    Oral    Take 1 Tab by mouth daily.  losartan (COZAAR) 25 mg tablet    Oral    Take 25 mg by mouth daily.  co-enzyme Q-10 (CO Q-10) 100 mg capsule    Oral    Take 100 mg by mouth daily.  nitroglycerin (NITROSTAT) 0.4 mg SL tablet    SubLINGual    0.4 mg by SubLINGual route every five (5) minutes as needed.  ascorbic acid (VITAMIN C) 500 mg tablet    Oral    Take 500 mg by mouth daily.                  No Known Allergies    Social   Social History   Substance Use Topics    Smoking status: Never Smoker    Smokeless tobacco: Never Used    Alcohol use No         Family history: Father  of myocardial infarction at age 66, otherwise no family history of heart disease. Review of Systems:   Constitutional: Negative. Respiratory: Negative. Cardiovascular: Negative. Gastrointestinal: Negative. Musculoskeletal: Negative. Physical Exam:   The patient is an alert, oriented, well developed, well nourished 77 y.o.  male who was in no acute distress at the time of my examination. Visit Vitals    /66    Pulse 65    Ht 6' 2\" (1.88 m)    Wt 73 kg (161 lb)    SpO2 97%    BMI 20.67 kg/m2      BP Readings from Last 3 Encounters:   18 124/66   18 140/80   18 110/60        Wt Readings from Last 3 Encounters:   18 73 kg (161 lb)   18 76.2 kg (168 lb)   18 76.2 kg (168 lb)       HEENT:  Conjuctiva white, mucosa moist, no pallor or cyanosis. Neck: Supple without masses, tenderness or thyromegaly. No jugular venous distention. Carotid upstroke is decreased on the left without bruit on the left with a moderate pitched bruit over the right carotid. Cardiovascular: Chest is symmetrical with good excursion. [place] is not displaced. No lifts, heaves or thrills. S1 and S2 are normal, without appreciable murmurs, rubs, clicks or gallops. Lungs: Clear to auscultation in all fields. Abdomen: Soft. No masses, tenderness or organomegaly. Extremities: No edema, with full peripheral pulses. Review of Data:  Please refer to past medical history for most recent cardiac testing. Results for orders placed or performed in visit on 18   AMB POC EKG ROUTINE W/ 12 LEADS, INTER & REP     Status: None    Narrative    Normal sinus rhythm, rate 65. First-degree AV block. Questionable pathologic Q-wave in lead III.   Compared to the EKG of 2018 the inferior Q waves now is potentially pathologic. Macy Schmitz D.O., FXIOMARAC. [facility]  [facility] [place]  [address] [place]    PLEASE NOTE:  This document has been produced using voice recognition software. Unrecognized errors in transcription may be present.

## 2018-08-17 NOTE — PROGRESS NOTES
1. Have you been to the ER, urgent care clinic since your last visit? Hospitalized since your last visit?no    2. Have you seen or consulted any other health care providers outside of the [address] [place] [address] since your last visit? Include any pap smears or colon screening.  no

## 2018-08-27 ENCOUNTER — HOSPITAL ENCOUNTER (OUTPATIENT)
Dept: VASCULAR SURGERY | Age: 78
Discharge: HOME | End: 2018-08-27
Attending: INTERNAL MEDICINE
Payer: MEDICARE

## 2018-08-27 DIAGNOSIS — R09.89 BRUIT OF RIGHT CAROTID ARTERY: ICD-10-CM

## 2018-08-27 DIAGNOSIS — I65.22 STENOSIS OF LEFT CAROTID ARTERY: ICD-10-CM

## 2018-08-27 LAB
LEFT CCA DIST DIAS: 0 CM/S
LEFT CCA DIST SYS: 47.76 CM/S
LEFT CCA MID DIAS: 0 CM/S
LEFT CCA MID SYS: 76.23 CM/S
LEFT CCA PROX DIAS: 0 CM/S
LEFT CCA PROX SYS: 72.34 CM/S
LEFT ECA DIAS: 0 CM/S
LEFT ECA SYS: 123.73 CM/S
LEFT VERTEBRAL DIAS: 13.89 CM/S
LEFT VERTEBRAL SYS: 59.12 CM/S
RIGHT CCA DIST DIAS: 19.98 CM/S
RIGHT CCA DIST SYS: 80.39 CM/S
RIGHT CCA MID DIAS: 19.98 CM/S
RIGHT CCA MID SYS: 87.36 CM/S
RIGHT CCA PROX DIAS: 15.33 CM/S
RIGHT CCA PROX SYS: 85.03 CM/S
RIGHT ECA DIAS: 14.77 CM/S
RIGHT ECA SYS: 251.7 CM/S
RIGHT ICA DIST DIAS: 22.3 CM/S
RIGHT ICA DIST SYS: 85.03 CM/S
RIGHT ICA MID DIAS: 22.3 CM/S
RIGHT ICA MID SYS: 73.42 CM/S
RIGHT ICA PROX DIAS: 33.92 CM/S
RIGHT ICA PROX SYS: 145.44 CM/S
RIGHT ICA/CCA SYS: 1.81
RIGHT VERTEBRAL DIAS: 8.56 CM/S
RIGHT VERTEBRAL SYS: 58.63 CM/S

## 2018-08-27 PROCEDURE — 93880 EXTRACRANIAL BILAT STUDY: CPT

## 2018-08-29 NOTE — PROGRESS NOTES
This is unchanged from the study in February 2018. Consequently, everything appears to be stable. Please let him know.  ES

## 2018-09-04 RX ORDER — CLOPIDOGREL BISULFATE 75 MG/1
TABLET ORAL
Qty: 90 TAB | Refills: 3 | Status: SHIPPED | OUTPATIENT
Start: 2018-09-04 | End: 2019-09-09 | Stop reason: SDUPTHER

## 2018-09-06 ENCOUNTER — TELEPHONE (OUTPATIENT)
Dept: CARDIOLOGY CLINIC | Age: 78
End: 2018-09-06

## 2018-09-06 NOTE — TELEPHONE ENCOUNTER
----- Message from Charanjit Landry DO sent at 8/29/2018  7:52 AM EDT ----- This is unchanged from the study in February 2018. Consequently, everything appears to be stable. Please let him know.  ES

## 2018-09-06 NOTE — TELEPHONE ENCOUNTER
Spoke to patient , verified name and date of birth. Explained results to the patient and answered all questions.

## 2019-02-25 ENCOUNTER — OFFICE VISIT (OUTPATIENT)
Dept: CARDIOLOGY CLINIC | Age: 79
End: 2019-02-25

## 2019-02-25 VITALS
WEIGHT: 162 LBS | DIASTOLIC BLOOD PRESSURE: 72 MMHG | HEART RATE: 64 BPM | OXYGEN SATURATION: 98 % | SYSTOLIC BLOOD PRESSURE: 136 MMHG | BODY MASS INDEX: 20.79 KG/M2 | HEIGHT: 74 IN

## 2019-02-25 DIAGNOSIS — R09.89 BRUIT: ICD-10-CM

## 2019-02-25 DIAGNOSIS — I65.22 STENOSIS OF LEFT CAROTID ARTERY: ICD-10-CM

## 2019-02-25 DIAGNOSIS — E78.5 DYSLIPIDEMIA: ICD-10-CM

## 2019-02-25 DIAGNOSIS — I25.10 ATHEROSCLEROSIS OF NATIVE CORONARY ARTERY OF NATIVE HEART WITHOUT ANGINA PECTORIS: Primary | ICD-10-CM

## 2019-02-25 NOTE — PROGRESS NOTES
HPI:  I saw Katrina Mcintosh Sr., in my office today in cardiovascular evaluation regarding his underlying ischemic heart disease, carotid disease, and hypercholesterolemia. Mr. Linwood Ladd is a very pleasant [de-identified] old white male with history of coronary artery disease, status post an inferior wall myocardial infarction back in 1998. He had a cardiac catheterization following a positive nuclear myocardial perfusion study back in 2002 and subsequent cardiac catheterization demonstrating a 75% proximal LAD lesion with a 35% circumflex obstruction, 45% OM1 obstruction and a 70% right coronary artery obstruction with ejection fraction of 70%.  
  
We last did a screening nuclear myocardial perfusion study which is just completed on August 3, 2018 and demonstrated a moderate sized mid to basal and inferoseptal infarct without ischemia and low normal overall left ventricular function with ejection fraction at 57%. This was similar to the study of December 29, 2015 although the LV function was slightly better being 53% at that time. The possibility that the inferior wall abnormality is from diaphragmatic soft tissue attenuation cannot be entirely excluded. This was still felt to be a low risk study.  
  
He comes in today and relates that he is really doing quite well. He is staying quite active for his age and denies any cardiovascular symptomatology at this time. Encounter Diagnoses Name Primary?  Atherosclerosis of native coronary artery of native heart without angina pectoris Yes  Bruit, right carotid  Stenosis of left carotid artery  Dyslipidemia Discussion: This gentleman continues to be doing quite well from a cardiovascular vantage without any signs of recurrent symptomatic obstructive coronary disease or any heart failure issues and a recent nuclear myocardial perfusion study within the last several months which was low risk. His latest lipid profile which have a copy of was done way back on September 18, 2018 but was extremely good with total cholesterol of 90, triglycerides of 30, HDL of 48, LDL of 36, and VLDL of 6 which is extremely low and would suggest a little likelihood of progression of any atherosclerosis on his Lipitor 80 mg daily. His blood pressure is well controlled today and his EKG is stable and since he has a known totally obstructed left coronary artery and a right coronary artery in the 50-69% range suggesting moderate stenosis back in August 2018 I do think we should repeat his carotid Dopplers in a few months. Since he is otherwise doing well I will simply plan to see him again in several months. PCP:  Loren Gunter MD 
 
  
Past Medical History:  
Diagnosis Date  Atherosclerotic heart disease  Blurred vision, left eye   
 resolution with cataract surgery  CAD (coronary artery disease)  Calculus of kidney  Carotid duplex ([address] 12/17/2015 Mild < 50% JAMAL stenosis. Chronic LICA occlusion. Probable significant RECA stenosis. Unchanged from study of 2/9/15.  Carotid stenosis  Chronic kidney disease, stage IV (severe) ([address] 12/11/2012 BUN and Creatinine 28/2.58   
 CVA (cerebral vascular accident) ([address] 2/22/05 & 2/23/05  
 secondary to totally occluded left internal carotid artery  Equivocal nuclear cardiac imaging test 12/29/2015 Low-intermediate risk. Inferobasal artifact vs prior injury. Possible mild inferobasal hypk. EF 53%. Positive EKG on EST. Ex time 8 mins 43 secs.  History of echocardiogram 02/23/2005 EF 50-55%. Basal-mid inferior hypk. Mild MR.  
 History of heart attack  Holter monitor, normal 05/20/2014 Benign 24-hour Holter study.  Hypercholesteremia  Ischemic heart disease  Myocardial infarction, inferior wall ([address] 1998  S/P cardiac catheterization 09/12/2002 LM patent. pLAD 75%. pCx 35%. OM1 45%. dRCA 70%. EF 50%.  Stroke [facility]) 2/2005  Vascular disease  Visceral arterial duplex 12/21/2012 No significant renal artery stenosis bilaterally. R kidney 10.4 cm. L kidney 10.1 cm. Bilateral intrinsic/med renal disease. Both renal veins patent.  Vitamin B 12 deficiency   
 with history of replacement Past Surgical History:  
Procedure Laterality Date  HX APPENDECTOMY  HX BACK SURGERY L5  
 HX CATARACT REMOVAL  9/2009  
 left  HX CATARACT REMOVAL  1/4/2012  
 right [address]  
 right coronary artery  HX HEART CATHETERIZATION  9/2002  HX HERNIA REPAIR    
 SPINE SURGERY PROCEDURE UNLISTED    
 spinal fusion Current Outpatient Medications Medication Sig  clopidogrel (PLAVIX) 75 mg tab TAKE 1 TABLET BY MOUTH ONCE DAILY  atorvastatin (LIPITOR) 80 mg tablet TAKE 1 TABLET BY MOUTH ONCE DAILY  nitroglycerin (NITROSTAT) 0.4 mg SL tablet 1 Tab by SubLINGual route as needed for Chest Pain (may repeat in 5 minutes time 2 if pain continues. ).  ketoconazole (NIZORAL) 2 % topical cream Apply  to affected area daily.  hydrocortisone (HYTONE) 2.5 % topical cream Apply  to affected area two (2) times daily as needed.  cyanocobalamin (VITAMIN B-12) 1,000 mcg tablet Take 1,000 mcg by mouth daily.  Cholecalciferol, Vitamin D3, 5,000 unit Tab Take 1 Tab by mouth daily.  losartan (COZAAR) 25 mg tablet Take 25 mg by mouth daily.  co-enzyme Q-10 (CO Q-10) 100 mg capsule Take 100 mg by mouth daily.  nitroglycerin (NITROSTAT) 0.4 mg SL tablet 0.4 mg by SubLINGual route every five (5) minutes as needed.  ascorbic acid (VITAMIN C) 500 mg tablet Take 500 mg by mouth daily. No current facility-administered medications for this visit. No Known Allergies Social  
Social History Tobacco Use  Smoking status: Never Smoker  Smokeless tobacco: Never Used Substance Use Topics  Alcohol use: No  
 
   
Family history: Father  of myocardial infarction at age 66, otherwise no family history of heart disease. Review of Systems:  
Constitutional: Negative. Respiratory: Negative. Cardiovascular: Negative. Gastrointestinal: Negative. Musculoskeletal: Negative. Physical Exam:  
The patient is an alert, oriented, well developed, well nourished [de-identified] y.o.  male who was in no acute distress at the time of my examination. Visit Vitals /72 Pulse 64 Ht 6' 2\" (1.88 m) Wt 73.5 kg (162 lb) SpO2 98% BMI 20.80 kg/m² BP Readings from Last 3 Encounters:  
19 136/72  
18 124/66  
18 140/80 Wt Readings from Last 3 Encounters:  
19 73.5 kg (162 lb) 18 73 kg (161 lb) 18 76.2 kg (168 lb) HEENT:  Conjuctiva white, mucosa moist, no pallor or cyanosis. Neck: Supple without masses, tenderness or thyromegaly. No jugular venous distention. Carotid upstroke is decreased on the left without bruit on the left with a moderate pitched bruit over the right carotid. Cardiovascular: Chest is symmetrical with good excursion. [place] is not displaced. No lifts, heaves or thrills. S1 and S2 are normal, without appreciable murmurs, rubs, clicks or gallops. Lungs: Clear to auscultation in all fields. Abdomen: Soft. No masses, tenderness or organomegaly. Extremities: No edema, with full peripheral pulses. Review of Data:  Please refer to past medical history for most recent cardiac testing. Results for orders placed or performed in visit on 19 AMB POC EKG ROUTINE W/ 12 LEADS, INTER & REP     Status: None Narrative Normal sinus rhythm rate 64. First-degree AV block. This EKG is otherwise within normal limits and similar to the EKG of 2018. Husam Glynn D.O., F.A.C.C. [facility] [address] [place] [address] [facility] [place] [address] [place] [address] [place] [facility] [phone] PLEASE NOTE:  This document has been produced using voice recognition software. Unrecognized errors in transcription may be present.

## 2019-04-23 RX ORDER — ATORVASTATIN CALCIUM 80 MG/1
TABLET, FILM COATED ORAL
Qty: 90 TAB | Refills: 3 | Status: SHIPPED | OUTPATIENT
Start: 2019-04-23 | End: 2021-01-07

## 2019-07-30 ENCOUNTER — HOSPITAL ENCOUNTER (OUTPATIENT)
Dept: VASCULAR SURGERY | Age: 79
Discharge: HOME | End: 2019-07-30
Attending: INTERNAL MEDICINE
Payer: MEDICARE

## 2019-07-30 DIAGNOSIS — R09.89 BRUIT: ICD-10-CM

## 2019-07-30 LAB
LEFT CCA DIST DIAS: 0 CM/S
LEFT CCA DIST SYS: 54.2 CM/S
LEFT CCA MID DIAS: 0 CM/S
LEFT CCA MID SYS: 80.11 CM/S
LEFT CCA PROX DIAS: 0 CM/S
LEFT CCA PROX SYS: 62 CM/S
LEFT ECA DIAS: 0 CM/S
LEFT ECA SYS: 86.6 CM/S
LEFT VERTEBRAL DIAS: 20.58 CM/S
LEFT VERTEBRAL SYS: 59.4 CM/S
RIGHT CCA DIST DIAS: 19.5 CM/S
RIGHT CCA DIST SYS: 72.6 CM/S
RIGHT CCA MID DIAS: 17.52 CM/S
RIGHT CCA MID SYS: 84.44 CM/S
RIGHT CCA PROX DIAS: 11.6 CM/S
RIGHT CCA PROX SYS: 72.6 CM/S
RIGHT ECA DIAS: 0 CM/S
RIGHT ECA SYS: 250.9 CM/S
RIGHT ICA DIST DIAS: 24.6 CM/S
RIGHT ICA DIST SYS: 89.7 CM/S
RIGHT ICA MID DIAS: 24.6 CM/S
RIGHT ICA MID SYS: 105.9 CM/S
RIGHT ICA PROX DIAS: 33.9 CM/S
RIGHT ICA PROX SYS: 166.4 CM/S
RIGHT ICA/CCA SYS: 2.3
RIGHT VERTEBRAL DIAS: 18.1 CM/S
RIGHT VERTEBRAL SYS: 73.1 CM/S

## 2019-07-30 PROCEDURE — 93880 EXTRACRANIAL BILAT STUDY: CPT

## 2019-07-30 NOTE — PROGRESS NOTES
Per your last note'  His blood pressure is well controlled today and his EKG is stable and since he has a known totally obstructed left coronary artery and a right coronary artery in the 50-69% range suggesting moderate stenosis back in August 2018 I do think we should repeat his carotid Dopplers in a few months. Since he is otherwise doing well I will simply plan to see him again in several months.

## 2019-08-02 NOTE — PROGRESS NOTES
There persists to be 8 totally occluded left internal carotid artery and a moderate right carotid artery stenosis which has not changed and is remaining in the 50 to 69% range as it was in August 2018. Please let him know.  ES

## 2019-08-06 ENCOUNTER — TELEPHONE (OUTPATIENT)
Dept: CARDIOLOGY CLINIC | Age: 79
End: 2019-08-06

## 2019-08-06 NOTE — TELEPHONE ENCOUNTER
Called patient, verified by two identifiers, name and . Patient notified of carotid duplex results. All questions answered at time of phone call.

## 2019-08-06 NOTE — TELEPHONE ENCOUNTER
----- Message from Dalton Shaffer DO sent at 8/2/2019  3:28 PM EDT ----- There persists to be 8 totally occluded left internal carotid artery and a moderate right carotid artery stenosis which has not changed and is remaining in the 50 to 69% range as it was in August 2018. Please let him know.  ES

## 2019-08-26 ENCOUNTER — OFFICE VISIT (OUTPATIENT)
Dept: CARDIOLOGY CLINIC | Age: 79
End: 2019-08-26

## 2019-08-26 VITALS
HEART RATE: 57 BPM | BODY MASS INDEX: 20.28 KG/M2 | OXYGEN SATURATION: 98 % | DIASTOLIC BLOOD PRESSURE: 80 MMHG | HEIGHT: 74 IN | WEIGHT: 158 LBS | SYSTOLIC BLOOD PRESSURE: 160 MMHG

## 2019-08-26 DIAGNOSIS — E78.5 DYSLIPIDEMIA: ICD-10-CM

## 2019-08-26 DIAGNOSIS — R00.1 SINUS BRADYCARDIA, PERSISTENT: ICD-10-CM

## 2019-08-26 DIAGNOSIS — I65.22 STENOSIS OF LEFT CAROTID ARTERY: ICD-10-CM

## 2019-08-26 DIAGNOSIS — R09.89 BRUIT: ICD-10-CM

## 2019-08-26 DIAGNOSIS — I25.10 ATHEROSCLEROSIS OF NATIVE CORONARY ARTERY OF NATIVE HEART WITHOUT ANGINA PECTORIS: Primary | ICD-10-CM

## 2019-08-26 NOTE — PROGRESS NOTES
HPI:   I saw Ulises Garg Sr., in my office today in cardiovascular evaluation regarding his underlying ischemic heart disease, carotid disease, and hypercholesterolemia. Mr. Justin Mcguire is a very pleasant [de-identified] old white male with history of coronary artery disease, status post an inferior wall myocardial infarction back in 1998. He had a cardiac catheterization following a positive nuclear myocardial perfusion study back in 2002 and subsequent cardiac catheterization demonstrating a 75% proximal LAD lesion with a 35% circumflex obstruction, 45% OM1 obstruction and a 70% right coronary artery obstruction with ejection fraction of 70%.       We last did a screening nuclear myocardial perfusion study which is just completed on August 3, 2018 and demonstrated a moderate sized mid to basal and inferoseptal infarct without ischemia and low normal overall left ventricular function with ejection fraction at 57%.  This was similar to the study of December 29, 2015 although the LV function was slightly better being 53% at that time. Lavern Rodriguez possibility that the inferior wall abnormality is from diaphragmatic soft tissue attenuation cannot be entirely excluded.  This was still felt to be a low risk study.     He does have known carotid disease and his most recent carotid duplex study which was completed on July 30, 2019 demonstrated a totally obstructed left internal carotid artery and a moderate 50 to 69% right internal carotid artery stenosis, this was really unchanged from the study in August 2018. Encounter Diagnoses   Name Primary?  Atherosclerosis of native coronary artery of native heart without angina pectoris Yes    Bruit, right carotid with know moderate LICA obstruction and total LICA on Carotid doppler's of 7/30/2019     Sinus bradycardia, persistent on low dose beta blockade     Dyslipidemia     Stenosis of left carotid artery        Discussion:  This patient appears to be doing about as well as we could expect and really have no recommendations for change at this time. He is not having any symptoms to suggest the development of symptomatic obstructive coronary artery disease. He does have carotid disease that we have been following in view of the fact that he is a totally obstructed left internal carotid artery and a moderately obstructed right internal carotid artery I think we should check it every several months rather than once a year even though its been remaining stable so I am going to get that completed in about 8 months and will follow him up after that evaluation. His lipid profile which was completed back on April 11, 2019 showed total cholesterol 108 with triglycerides of 47, HDL 57, LDL of 42, and VLDL of 9 which I think is excellent control on Lipitor 80 mg daily. His blood pressure was somewhat elevated today when checked by my staff I checked again myself and got 160/80. He tells me that at home is always well controlled so of asked him to take it a few times a week at home for a few weeks and if it stays predominantly below 017 systolic I do not think we have to make any changes, but if he goes above this level then he should follow-up with Dr. Rupert Short. Since he is otherwise doing well I will see him again in several months. PCP:  Ragini Starkey MD       Past Medical History:   Diagnosis Date    Atherosclerotic heart disease     Blurred vision, left eye     resolution with cataract surgery    CAD (coronary artery disease)     Calculus of kidney     Carotid duplex ([facility] [address] 12/17/2015    Mild < 50% JAMAL stenosis. Chronic LICA occlusion. Probable significant RECA stenosis. Unchanged from study of 2/9/15.     Carotid stenosis     Chronic kidney disease, stage IV (severe) ([address] 12/11/2012    BUN and Creatinine 28/2.58     CVA (cerebral vascular accident) ([address] 2/22/05 & 2/23/05    secondary to totally occluded left internal carotid artery    Equivocal nuclear cardiac imaging test 12/29/2015    Low-intermediate risk. Inferobasal artifact vs prior injury. Possible mild inferobasal hypk. EF 53%. Positive EKG on EST. Ex time 8 mins 43 secs.  History of echocardiogram 02/23/2005    EF 50-55%. Basal-mid inferior hypk. Mild MR.    History of heart attack     Holter monitor, normal 05/20/2014    Benign 24-hour Holter study.  Hypercholesteremia     Ischemic heart disease     Myocardial infarction, inferior wall ([address] 1998    S/P cardiac catheterization 09/12/2002    LM patent. pLAD 75%. pCx 35%. OM1 45%. dRCA 70%. EF 50%.  Stroke [facility]) 2/2005    Vascular disease     Visceral arterial duplex 12/21/2012    No significant renal artery stenosis bilaterally. R kidney 10.4 cm. L kidney 10.1 cm. Bilateral intrinsic/med renal disease. Both renal veins patent.  Vitamin B 12 deficiency     with history of replacement         Past Surgical History:   Procedure Laterality Date    HX APPENDECTOMY      HX BACK SURGERY      L5    HX CATARACT REMOVAL  9/2009    left    HX CATARACT REMOVAL  1/4/2012    right    HX CORONARY STENT PLACEMENT  1998    right coronary artery    HX HEART CATHETERIZATION  9/2002    HX HERNIA REPAIR      SPINE SURGERY PROCEDURE UNLISTED      spinal fusion       Current Outpatient Medications   Medication Sig    atorvastatin (LIPITOR) 80 mg tablet TAKE 1 TABLET BY MOUTH ONCE DAILY    clopidogrel (PLAVIX) 75 mg tab TAKE 1 TABLET BY MOUTH ONCE DAILY    nitroglycerin (NITROSTAT) 0.4 mg SL tablet 1 Tab by SubLINGual route as needed for Chest Pain (may repeat in 5 minutes time 2 if pain continues. ).  ketoconazole (NIZORAL) 2 % topical cream Apply  to affected area daily.  hydrocortisone (HYTONE) 2.5 % topical cream Apply  to affected area two (2) times daily as needed.  cyanocobalamin (VITAMIN B-12) 1,000 mcg tablet Take 1,000 mcg by mouth daily.  Cholecalciferol, Vitamin D3, 5,000 unit Tab Take 1 Tab by mouth daily.       losartan (COZAAR) 25 mg tablet Take 25 mg by mouth daily.  co-enzyme Q-10 (CO Q-10) 100 mg capsule Take 100 mg by mouth daily.  nitroglycerin (NITROSTAT) 0.4 mg SL tablet 0.4 mg by SubLINGual route every five (5) minutes as needed.  ascorbic acid (VITAMIN C) 500 mg tablet Take 500 mg by mouth daily. No current facility-administered medications for this visit. No Known Allergies    Social   Social History     Tobacco Use    Smoking status: Never Smoker    Smokeless tobacco: Never Used   Substance Use Topics    Alcohol use: No         Family history: Father  of myocardial infarction at age 66, otherwise no family history of heart disease. Review of Systems:   Constitutional: Negative. Respiratory: Negative. Cardiovascular: Negative. Gastrointestinal: Negative. Musculoskeletal: Negative. Physical Exam:   The patient is an alert, oriented, well developed, well nourished [de-identified] y.o.  male who was in no acute distress at the time of my examination. Visit Vitals  /80 (BP 1 Location: Right arm, BP Patient Position: Sitting)   Pulse (!) 57   Ht 6' 2\" (1.88 m)   Wt 71.7 kg (158 lb)   SpO2 98%   BMI 20.29 kg/m²      BP Readings from Last 3 Encounters:   19 160/80   19 136/72   18 124/66        Wt Readings from Last 3 Encounters:   19 71.7 kg (158 lb)   19 73.5 kg (162 lb)   18 73 kg (161 lb)       HEENT:  Conjuctiva white, mucosa moist, no pallor or cyanosis. Neck: Supple without masses, tenderness or thyromegaly. No jugular venous distention. Carotid upstroke is decreased on the left without bruit on the left with a moderate pitched bruit over the right carotid. Cardiovascular: Chest is symmetrical with good excursion. [place] is not displaced. No lifts, heaves or thrills. S1 and S2 are normal, without appreciable murmurs, rubs, clicks or gallops. Lungs: Clear to auscultation in all fields. Abdomen: Soft.   No masses, tenderness or organomegaly. Extremities: No edema, with full peripheral pulses. Review of Data:  Please refer to past medical history for most recent cardiac testing. Results for orders placed or performed in visit on 08/26/19   AMB POC EKG ROUTINE W/ 12 LEADS, INTER & REP     Status: None    Narrative    Sinus bradycardia with rate of 57 and marked sinus arrhythmia first-degree AV block and one PJC. Sully Huddleston D.O., F.A.C.C. Cardiovascular Specialists  [facility] [place]  [address] [place] [address]. [address]    PLEASE NOTE:  This document has been produced using voice recognition software. Unrecognized errors in transcription may be present.

## 2019-08-26 NOTE — PATIENT INSTRUCTIONS
A  will call you to schedule your cardiac testing.  If you do not receive a phone call within 48 hours or miss it you may call; Central Scheduling [phone]'

## 2019-09-09 RX ORDER — CLOPIDOGREL BISULFATE 75 MG/1
TABLET ORAL
Qty: 90 TAB | Refills: 3 | Status: SHIPPED | OUTPATIENT
Start: 2019-09-09 | End: 2020-09-08 | Stop reason: SDUPTHER

## 2020-07-28 ENCOUNTER — OFFICE VISIT (OUTPATIENT)
Dept: CARDIOLOGY CLINIC | Age: 80
End: 2020-07-28

## 2020-07-28 VITALS
BODY MASS INDEX: 20.15 KG/M2 | OXYGEN SATURATION: 98 % | SYSTOLIC BLOOD PRESSURE: 124 MMHG | DIASTOLIC BLOOD PRESSURE: 64 MMHG | HEART RATE: 69 BPM | WEIGHT: 157 LBS | HEIGHT: 74 IN

## 2020-07-28 DIAGNOSIS — I25.10 ATHEROSCLEROSIS OF NATIVE CORONARY ARTERY OF NATIVE HEART WITHOUT ANGINA PECTORIS: Primary | ICD-10-CM

## 2020-07-28 DIAGNOSIS — I65.22 STENOSIS OF LEFT CAROTID ARTERY: ICD-10-CM

## 2020-07-28 DIAGNOSIS — R00.1 SINUS BRADYCARDIA, PERSISTENT: ICD-10-CM

## 2020-07-28 DIAGNOSIS — R09.89 BRUIT: ICD-10-CM

## 2020-07-28 DIAGNOSIS — E78.5 DYSLIPIDEMIA: ICD-10-CM

## 2020-07-28 NOTE — PROGRESS NOTES
HPI:   I saw Fish Hester Sr., in my office today in cardiovascular evaluation regarding his underlying ischemic heart disease, carotid disease, and hypercholesterolemia. Mr. Abdirizak Ventura is a very pleasant 79 year old white male with history of coronary artery disease, status post an inferior wall myocardial infarction back in 1998. He had a cardiac catheterization following a positive nuclear myocardial perfusion study back in 2002 and subsequent cardiac catheterization demonstrating a 75% proximal LAD lesion with a 35% circumflex obstruction, 45% OM1 obstruction and a 70% right coronary artery obstruction with ejection fraction of 70%.       We last did a screening nuclear myocardial perfusion study which is just completed on August 3, 2018 and demonstrated a moderate sized mid to basal and inferoseptal infarct without ischemia and low normal overall left ventricular function with ejection fraction at 57%.  This was similar to the study of December 29, 2015 although the LV function was slightly better being 53% at that time. [place] possibility that the inferior wall abnormality is from diaphragmatic soft tissue attenuation cannot be entirely excluded.  This was still felt to be a low risk study.     He does have known carotid disease and his most recent carotid duplex study which was completed on July 30, 2019 demonstrated a totally obstructed left internal carotid artery and a moderate 50 to 69% right internal carotid artery stenosis, this was really unchanged from the study in August 2018. Encounter Diagnoses   Name Primary?  Atherosclerosis of native coronary artery of native heart without angina pectoris Yes    Bruit, right carotid with know moderate LICA obstruction and total LICA on Carotid doppler's of 7/30/2019     Stenosis of left carotid artery     Sinus bradycardia, persistent on low dose beta blockade     Dyslipidemia        Discussion:  This patient appears to be doing about as well as we could expect and really have no recommendations for change at this time. He is not having any symptoms to suggest the development of symptomatic obstructive coronary artery disease. He does have carotid disease that we have been following in view of the fact that he is a totally obstructed left internal carotid artery and a moderately obstructed right internal carotid artery I think we should check it every several months rather than once a year even though its been remaining stable so I am going to get that completed now since it has been a year since his last study due to the fact that tests have been put off secondary to the COVID-19 pandemic. His lipid profile which was completed back on April 11, 2019 showed total cholesterol 108 with triglycerides of 47, HDL 57, LDL of 42, and VLDL of 9 which I think is excellent control on Lipitor 80 mg daily. His blood pressure is well controlled today and his EKG appears to be stable so I will simply plan to have him seen again in several months either myself or Dr. Patricia Bradford since I will be retiring in this time frame. PCP:  Suzy Goss MD       Past Medical History:   Diagnosis Date    Atherosclerotic heart disease     Blurred vision, left eye     resolution with cataract surgery    CAD (coronary artery disease)     Calculus of kidney     Carotid duplex ([facility] [address] 12/17/2015    Mild < 50% JAMAL stenosis. Chronic LICA occlusion. Probable significant RECA stenosis. Unchanged from study of 2/9/15.  Carotid stenosis     Chronic kidney disease, stage IV (severe) ([address] 12/11/2012    BUN and Creatinine 28/2.58     CVA (cerebral vascular accident) ([address] 2/22/05 & 2/23/05    secondary to totally occluded left internal carotid artery    Equivocal nuclear cardiac imaging test 12/29/2015    Low-intermediate risk. Inferobasal artifact vs prior injury. Possible mild inferobasal hypk. EF 53%. Positive EKG on EST. Ex time 8 mins 43 secs.     History of echocardiogram 02/23/2005    EF 50-55%. Basal-mid inferior hypk. Mild MR.    History of heart attack     Holter monitor, normal 05/20/2014    Benign 24-hour Holter study.  Hypercholesteremia     Ischemic heart disease     Myocardial infarction, inferior wall ([address] 1998    S/P cardiac catheterization 09/12/2002    LM patent. pLAD 75%. pCx 35%. OM1 45%. dRCA 70%. EF 50%.  Stroke [facility]) 2/2005    Vascular disease     Visceral arterial duplex 12/21/2012    No significant renal artery stenosis bilaterally. R kidney 10.4 cm. L kidney 10.1 cm. Bilateral intrinsic/med renal disease. Both renal veins patent.  Vitamin B 12 deficiency     with history of replacement         Past Surgical History:   Procedure Laterality Date    HX APPENDECTOMY      HX BACK SURGERY      L5    HX CATARACT REMOVAL  9/2009    left    HX CATARACT REMOVAL  1/4/2012    right    HX CORONARY STENT PLACEMENT  1998    right coronary artery    HX HEART CATHETERIZATION  9/2002    HX HERNIA REPAIR      SPINE SURGERY PROCEDURE UNLISTED      spinal fusion       Current Outpatient Medications   Medication Sig    clopidogrel (PLAVIX) 75 mg tab TAKE 1 TABLET BY MOUTH ONCE DAILY    atorvastatin (LIPITOR) 80 mg tablet TAKE 1 TABLET BY MOUTH ONCE DAILY    cyanocobalamin (VITAMIN B-12) 1,000 mcg tablet Take 1,000 mcg by mouth daily.  Cholecalciferol, Vitamin D3, 5,000 unit Tab Take 1 Tab by mouth daily.  losartan (COZAAR) 25 mg tablet Take 25 mg by mouth daily.  co-enzyme Q-10 (CO Q-10) 100 mg capsule Take 100 mg by mouth daily.  ascorbic acid (VITAMIN C) 500 mg tablet Take 500 mg by mouth daily.  nitroglycerin (NITROSTAT) 0.4 mg SL tablet 1 Tab by SubLINGual route as needed for Chest Pain (may repeat in 5 minutes time 2 if pain continues. ).  ketoconazole (NIZORAL) 2 % topical cream Apply  to affected area daily.     hydrocortisone (HYTONE) 2.5 % topical cream Apply  to affected area two (2) times daily as needed.  nitroglycerin (NITROSTAT) 0.4 mg SL tablet 0.4 mg by SubLINGual route every five (5) minutes as needed. No current facility-administered medications for this visit. No Known Allergies    Social   Social History     Tobacco Use    Smoking status: Never Smoker    Smokeless tobacco: Never Used   Substance Use Topics    Alcohol use: No         Family history: Father  of myocardial infarction at age 66, otherwise no family history of heart disease. Review of Systems:   Constitutional: Negative. Respiratory: Negative. Cardiovascular: Negative. Gastrointestinal: Negative. Musculoskeletal: Negative. Physical Exam:   The patient is an alert, oriented, well developed, well nourished 79 y.o.  male who was in no acute distress at the time of my examination. Visit Vitals  /64   Pulse 69   Ht 6' 2\" (1.88 m)   Wt 157 lb (71.2 kg)   SpO2 98%   BMI 20.16 kg/m²      BP Readings from Last 3 Encounters:   20 124/64   19 160/80   19 136/72        Wt Readings from Last 3 Encounters:   20 157 lb (71.2 kg)   19 158 lb (71.7 kg)   19 162 lb (73.5 kg)       HEENT:  Conjuctiva white, mucosa moist, no pallor or cyanosis. Neck: Supple without masses, tenderness or thyromegaly. No jugular venous distention. Carotid upstroke is decreased on the left without bruit on the left with a moderate pitched bruit over the right carotid. Cardiovascular: Chest is symmetrical with good excursion. [place] is not displaced. No lifts, heaves or thrills. S1 and S2 are normal, without appreciable murmurs, rubs, clicks or gallops. Lungs: Clear to auscultation in all fields. Abdomen: Soft. No masses, tenderness or organomegaly. Extremities: No edema, with full peripheral pulses. Review of Data:  Please refer to past medical history for most recent cardiac testing.       Results for orders placed or performed in visit on 19   [facility] POC EKG ROUTINE W/ 12 LEADS, INTER & REP     Status: None    Narrative    Sinus bradycardia with rate of 57 and marked sinus arrhythmia first-degree AV block and one PJC. Froylan Jimenez D.O., F.A.C.C. Cardiovascular Specialists  [facility] [place]  [address] [place] [address]. [address] [place]    PLEASE NOTE:  This document has been produced using voice recognition software. Unrecognized errors in transcription may be present.

## 2020-07-28 NOTE — PATIENT INSTRUCTIONS
Carotid droppler Follow up 6-8 months Dr. Dianna Oconnor If you have not heard from the central scheduler to schedule your testing in 48 hours, please call [phone].

## 2020-08-06 ENCOUNTER — HOSPITAL ENCOUNTER (OUTPATIENT)
Dept: VASCULAR SURGERY | Age: 80
Discharge: HOME | End: 2020-08-06
Attending: INTERNAL MEDICINE
Payer: MEDICARE

## 2020-08-06 DIAGNOSIS — I65.22 STENOSIS OF LEFT CAROTID ARTERY: ICD-10-CM

## 2020-08-06 DIAGNOSIS — R09.89 BRUIT: ICD-10-CM

## 2020-08-06 PROCEDURE — 93880 EXTRACRANIAL BILAT STUDY: CPT

## 2020-08-07 LAB
LEFT CCA DIST DIAS: 6.3 CM/S
LEFT CCA DIST SYS: 68.5 CM/S
LEFT CCA MID DIAS: 7.64 CM/S
LEFT CCA MID SYS: 80.11 CM/S
LEFT CCA PROX DIAS: 9.8 CM/S
LEFT CCA PROX SYS: 58.2 CM/S
LEFT ECA DIAS: 0 CM/S
LEFT ECA SYS: 115.6 CM/S
LEFT ICA DIST DIAS: 0 CM/S
LEFT ICA DIST SYS: 0.5 CM/S
LEFT ICA MID DIAS: 0 CM/S
LEFT ICA MID SYS: 0.2 CM/S
LEFT ICA PROX DIAS: 0 CM/S
LEFT ICA PROX SYS: 0.3 CM/S
LEFT ICA/CCA SYS: 0.85
LEFT VERTEBRAL DIAS: 13.08 CM/S
LEFT VERTEBRAL SYS: 59.2 CM/S
RIGHT CCA DIST DIAS: 17.6 CM/S
RIGHT CCA DIST SYS: 90.3 CM/S
RIGHT CCA MID DIAS: 12.76 CM/S
RIGHT CCA MID SYS: 106.44 CM/S
RIGHT CCA PROX DIAS: 19.5 CM/S
RIGHT CCA PROX SYS: 124 CM/S
RIGHT ECA DIAS: 0 CM/S
RIGHT ECA SYS: 276.5 CM/S
RIGHT ICA DIST DIAS: 20.2 CM/S
RIGHT ICA DIST SYS: 74.1 CM/S
RIGHT ICA MID DIAS: 32.3 CM/S
RIGHT ICA MID SYS: 132.2 CM/S
RIGHT ICA PROX DIAS: 40.9 CM/S
RIGHT ICA PROX SYS: 164 CM/S
RIGHT ICA/CCA SYS: 1.8
RIGHT VERTEBRAL DIAS: 7.64 CM/S
RIGHT VERTEBRAL SYS: 54.2 CM/S

## 2020-08-07 NOTE — PROGRESS NOTES
There is no real change in his carotid doppler's with total LICA and moderate right JAMAL narrowing. Should repeat in several months to one year since it has been staying stable for some time. Please let the patient know.  ES

## 2020-08-10 ENCOUNTER — TELEPHONE (OUTPATIENT)
Dept: CARDIOLOGY CLINIC | Age: 80
End: 2020-08-10

## 2020-08-10 NOTE — TELEPHONE ENCOUNTER
----- Message from Diana Hoyos DO sent at 8/7/2020  4:24 PM EDT ----- There is no real change in his carotid doppler's with total LICA and moderate right JAMAL narrowing. Should repeat in several months to one year since it has been staying stable for some time. Please let the patient know.  ES

## 2020-09-08 DIAGNOSIS — R09.89 BRUIT: Primary | ICD-10-CM

## 2020-09-09 RX ORDER — CLOPIDOGREL BISULFATE 75 MG/1
TABLET ORAL
Qty: 90 TAB | Refills: 3 | Status: SHIPPED | OUTPATIENT
Start: 2020-09-09 | End: 2021-03-19

## 2021-01-07 RX ORDER — ATORVASTATIN CALCIUM 80 MG/1
TABLET, FILM COATED ORAL
Qty: 90 TAB | Refills: 3 | Status: SHIPPED | OUTPATIENT
Start: 2021-01-07 | End: 2022-02-10

## 2021-02-26 ENCOUNTER — OFFICE VISIT (OUTPATIENT)
Dept: CARDIOLOGY CLINIC | Age: 81
End: 2021-02-26
Payer: MEDICARE

## 2021-02-26 VITALS
SYSTOLIC BLOOD PRESSURE: 120 MMHG | HEIGHT: 74 IN | BODY MASS INDEX: 20.74 KG/M2 | OXYGEN SATURATION: 98 % | DIASTOLIC BLOOD PRESSURE: 52 MMHG | HEART RATE: 71 BPM | WEIGHT: 161.6 LBS

## 2021-02-26 DIAGNOSIS — R09.89 BRUIT: ICD-10-CM

## 2021-02-26 DIAGNOSIS — I65.22 STENOSIS OF LEFT CAROTID ARTERY: Primary | ICD-10-CM

## 2021-02-26 PROCEDURE — G8420 CALC BMI NORM PARAMETERS: HCPCS | Performed by: INTERNAL MEDICINE

## 2021-02-26 PROCEDURE — G8510 SCR DEP NEG, NO PLAN REQD: HCPCS | Performed by: INTERNAL MEDICINE

## 2021-02-26 PROCEDURE — 1101F PT FALLS ASSESS-DOCD LE1/YR: CPT | Performed by: INTERNAL MEDICINE

## 2021-02-26 PROCEDURE — 99214 OFFICE O/P EST MOD 30 MIN: CPT | Performed by: INTERNAL MEDICINE

## 2021-02-26 PROCEDURE — G8536 NO DOC ELDER MAL SCRN: HCPCS | Performed by: INTERNAL MEDICINE

## 2021-02-26 PROCEDURE — G8427 DOCREV CUR MEDS BY ELIG CLIN: HCPCS | Performed by: INTERNAL MEDICINE

## 2021-02-26 RX ORDER — AMLODIPINE BESYLATE 5 MG/1
TABLET ORAL
COMMUNITY
Start: 2021-02-24 | End: 2021-04-02 | Stop reason: ALTCHOICE

## 2021-02-26 NOTE — PROGRESS NOTES
Sravan Brendan presents today for   Chief Complaint   Patient presents with    Follow-up     prev skillen patient. 6 month follow up        Sravan Cardona preferred language for health care discussion is english/other. Is someone accompanying this pt? no    Is the patient using any DME equipment during [address]? no    Depression Screening:  3 most recent PHQ Screens 2/26/2021   Little interest or pleasure in doing things Not at all   Feeling down, depressed, irritable, or hopeless Not at all   Total Score PHQ 2 0       Learning Assessment:  Learning Assessment 7/12/2016   PRIMARY LEARNER Patient   HIGHEST LEVEL OF EDUCATION - PRIMARY LEARNER  -   BARRIERS PRIMARY LEARNER -   CO-LEARNER CAREGIVER -   PRIMARY LANGUAGE ENGLISH   LEARNER PREFERENCE PRIMARY DEMONSTRATION   ANSWERED BY patient   RELATIONSHIP SELF       Abuse Screening:  Abuse Screening Questionnaire 2/26/2021   Do you ever feel afraid of your partner? N   Are you in a relationship with someone who physically or mentally threatens you? N   Is it safe for you to go home? Y       Fall Risk  Fall Risk Assessment, last 12 mths 2/26/2021   Able to walk? Yes   Fall in past 12 months? 0   Do you feel unsteady? 0   Are you worried about falling 0       Pt currently taking Anticoagulant therapy? no    Coordination of Care:  1. Have you been to the ER, urgent care clinic since your last visit? Hospitalized since your last visit? no    2. Have you seen or consulted any other health care providers outside of the [address] [place] [address] since your last visit? Include any pap smears or colon screening.  no

## 2021-02-26 NOTE — PATIENT INSTRUCTIONS
Follow up with Dr. Wei Platt in 1 year Bilateral carotid doppler study - stenosis If you have not heard from the central scheduler to schedule your testing in 48 hours, please call [phone].

## 2021-02-27 NOTE — PROGRESS NOTES
Lizbeth Aldana is in the office today for cardiovascular evaluation regarding his underlying ischemic heart disease, carotid disease, and hypercholesterolemia. He has a history of coronary artery disease, with a prior inferior wall myocardial infarction  in 1998. He had a cardiac catheterization following an abnormal nuclear myocardial perfusion study  in 2002. Subsequent cardiac catheterization demonstratined a 75% proximal LAD lesion, a 35% circumflex obstruction, a 45% OM1 obstruction and a 70% right coronary artery obstruction with an ejection fraction of 70%.       His  last nuclear stress test was; in August 2018 and it was felt to be a low risk study. He has severe carotid disease and his most recent carotid duplex study  was completed on July 30, 2019. It demonstrated a totally obstructed left internal carotid artery and a moderate 50 to 69% right internal carotid artery stenosis, this was really unchanged from the study in August 2018. In the office today, he reports no chest pain or shortness of breath. He has had no dizziness, lightheadedness, near-syncope or syncope. He has had no peripheral swelling. He is still working part-time. Dr. Leslie Reynolds follows his cholesterol status. Encounter Diagnoses   Name Primary?  Stenosis of left carotid artery Yes    Bruit, right carotid with know moderate LICA obstruction and total LICA on Carotid doppler's of 7/30/2019        Plan; will order carotid duplexes to be done in April or May of this year. Return in 1 year. PCP:  Bertin Bagley MD       Past Medical History:   Diagnosis Date    Atherosclerotic heart disease     Blurred vision, left eye     resolution with cataract surgery    CAD (coronary artery disease)     Calculus of kidney     Carotid duplex ([facility] [facility] 12/17/2015    Mild < 50% JAMAL stenosis. Chronic LICA occlusion. Probable significant RECA stenosis. Unchanged from study of 2/9/15.     Carotid stenosis     Chronic kidney disease, stage IV (severe) ([facility] [address] 12/11/2012    BUN and Creatinine 28/2.58     CVA (cerebral vascular accident) ([facility] [address] 2/22/05 & 2/23/05    secondary to totally occluded left internal carotid artery    Equivocal nuclear cardiac imaging test 12/29/2015    Low-intermediate risk. Inferobasal artifact vs prior injury. Possible mild inferobasal hypk. EF 53%. Positive EKG on EST. Ex time 8 mins 43 secs.  History of echocardiogram 02/23/2005    EF 50-55%. Basal-mid inferior hypk. Mild MR.    History of heart attack     Holter monitor, normal 05/20/2014    Benign 24-hour Holter study.  Hypercholesteremia     Ischemic heart disease     Myocardial infarction, inferior wall ([facility] [address] 1998    S/P cardiac catheterization 09/12/2002    LM patent. pLAD 75%. pCx 35%. OM1 45%. dRCA 70%. EF 50%.  Stroke [facility]) 2/2005    Vascular disease     Visceral arterial duplex 12/21/2012    No significant renal artery stenosis bilaterally. R kidney 10.4 cm. L kidney 10.1 cm. Bilateral intrinsic/med renal disease. Both renal veins patent.  Vitamin B 12 deficiency     with history of replacement         Past Surgical History:   Procedure Laterality Date    HX APPENDECTOMY      HX BACK SURGERY      L5    HX CATARACT REMOVAL  9/2009    left    HX CATARACT REMOVAL  1/4/2012    right    HX CORONARY STENT PLACEMENT  1998    right coronary artery    HX HEART CATHETERIZATION  9/2002    HX HERNIA REPAIR      GA SPINE SURGERY PROCEDURE UNLISTED      spinal fusion       Current Outpatient Medications   Medication Sig    amLODIPine (NORVASC) 5 mg tablet     atorvastatin (LIPITOR) 80 mg tablet TAKE 1 TABLET BY MOUTH ONCE DAILY    clopidogreL (PLAVIX) 75 mg tab TAKE 1 TABLET BY MOUTH ONCE DAILY    nitroglycerin (NITROSTAT) 0.4 mg SL tablet 1 Tab by SubLINGual route as needed for Chest Pain (may repeat in 5 minutes time 2 if pain continues. ).  ketoconazole (NIZORAL) 2 % topical cream Apply  to affected area daily.     cyanocobalamin (VITAMIN B-12) 1,000 mcg tablet Take 1,000 mcg by mouth daily.  Cholecalciferol, Vitamin D3, 5,000 unit Tab Take 1 Tab by mouth daily.  losartan (COZAAR) 25 mg tablet Take 25 mg by mouth daily.  co-enzyme Q-10 (CO Q-10) 100 mg capsule Take 100 mg by mouth daily.  nitroglycerin (NITROSTAT) 0.4 mg SL tablet 0.4 mg by SubLINGual route every five (5) minutes as needed.  ascorbic acid (VITAMIN C) 500 mg tablet Take 500 mg by mouth daily. No current facility-administered medications for this visit. No Known Allergies    Social   Social History     Tobacco Use    Smoking status: Never Smoker    Smokeless tobacco: Never Used   Substance Use Topics    Alcohol use: No         Family history: Father  of myocardial infarction at age 66, otherwise no family history of heart disease. Review of Systems:   Constitutional: Negative. Respiratory: Negative. Cardiovascular: Negative. Gastrointestinal: Negative. Musculoskeletal: Negative. Physical Exam:   The patient is an alert, oriented, well developed, well nourished 79 y.o.  male who was in no acute distress at the time of my examination. Visit Vitals  BP (!) 120/52 (BP 1 Location: Left upper arm, BP Patient Position: Sitting, BP Cuff Size: Adult)   Pulse 71   Ht 6' 2\" (1.88 m)   Wt 161 lb 9.6 oz (73.3 kg)   SpO2 98%   BMI 20.75 kg/m²      BP Readings from Last 3 Encounters:   21 (!) 120/52   20 124/64   19 160/80        Wt Readings from Last 3 Encounters:   21 161 lb 9.6 oz (73.3 kg)   20 157 lb (71.2 kg)   19 158 lb (71.7 kg)       HEENT:  Conjuctiva white, mucosa moist, no pallor or cyanosis. Neck: Supple without masses, tenderness or thyromegaly. No jugular venous distention. Carotid upstroke is decreased on the left without bruit on the left with a moderate pitched bruit over the right carotid.    Cardiovascular: Chest is symmetrical with good excursion. [place] is not displaced. No lifts, heaves or thrills. S1 and S2 are normal, without appreciable murmurs, rubs, clicks or gallops. Lungs: Clear to auscultation in all fields. Abdomen: Soft. No masses, tenderness or organomegaly. Extremities: No edema, with full peripheral pulses. Review of Data:  Please refer to past medical history for most recent cardiac testing. Results for orders placed or performed in visit on 08/26/19   AMB POC EKG ROUTINE W/ 12 LEADS, INTER & REP     Status: None    Narrative    Sinus bradycardia with rate of 57 and marked sinus arrhythmia first-degree AV block and one PJC. MD ZACH Carmona.O., F.A.C.C. [facility]  [facility]  [address] [place] [address]. [address] [facility]    PLEASE NOTE:  This document has been produced using voice recognition software. Unrecognized errors in transcription may be present.

## 2021-03-02 ENCOUNTER — HOSPITAL ENCOUNTER (EMERGENCY)
Age: 81
Discharge: HOME | End: 2021-03-02
Attending: STUDENT IN AN ORGANIZED HEALTH CARE EDUCATION/TRAINING PROGRAM
Payer: MEDICARE

## 2021-03-02 ENCOUNTER — APPOINTMENT (OUTPATIENT)
Dept: CT IMAGING | Age: 81
End: 2021-03-02
Attending: PHYSICIAN ASSISTANT
Payer: MEDICARE

## 2021-03-02 VITALS
HEART RATE: 78 BPM | SYSTOLIC BLOOD PRESSURE: 156 MMHG | TEMPERATURE: 97.8 F | WEIGHT: 163 LBS | BODY MASS INDEX: 21.6 KG/M2 | RESPIRATION RATE: 16 BRPM | DIASTOLIC BLOOD PRESSURE: 90 MMHG | OXYGEN SATURATION: 98 % | HEIGHT: 73 IN

## 2021-03-02 DIAGNOSIS — K57.90 DIVERTICULOSIS: ICD-10-CM

## 2021-03-02 DIAGNOSIS — R79.89 ELEVATED SERUM CREATININE: ICD-10-CM

## 2021-03-02 DIAGNOSIS — N20.0 NEPHROLITHIASIS: Primary | ICD-10-CM

## 2021-03-02 DIAGNOSIS — I70.90 ARTERIOSCLEROSIS: ICD-10-CM

## 2021-03-02 DIAGNOSIS — N28.1 RENAL CYST: ICD-10-CM

## 2021-03-02 LAB
ALBUMIN SERPL-MCNC: 3.9 G/DL (ref 3.4–5)
ALBUMIN/GLOB SERPL: 1.1 {RATIO} (ref 0.8–1.7)
ALP SERPL-CCNC: 65 U/L (ref 45–117)
ALT SERPL-CCNC: 25 U/L (ref 16–61)
ANION GAP SERPL CALC-SCNC: 7 MMOL/L (ref 3–18)
APPEARANCE UR: ABNORMAL
AST SERPL-CCNC: 19 U/L (ref 10–38)
ATRIAL RATE: 84 BPM
BACTERIA URNS QL MICRO: ABNORMAL /HPF
BASOPHILS # BLD: 0 K/UL (ref 0–0.1)
BASOPHILS NFR BLD: 0 % (ref 0–2)
BILIRUB SERPL-MCNC: 1.1 MG/DL (ref 0.2–1)
BILIRUB UR QL: NEGATIVE
BUN SERPL-MCNC: 20 MG/DL (ref 7–18)
BUN/CREAT SERPL: 11 (ref 12–20)
CALCIUM SERPL-MCNC: 9.1 MG/DL (ref 8.5–10.1)
CALCULATED P AXIS, ECG09: 84 DEGREES
CALCULATED R AXIS, ECG10: 57 DEGREES
CALCULATED T AXIS, ECG11: 65 DEGREES
CHLORIDE SERPL-SCNC: 111 MMOL/L (ref 100–111)
CO2 SERPL-SCNC: 25 MMOL/L (ref 21–32)
COLOR UR: YELLOW
CREAT SERPL-MCNC: 1.77 MG/DL (ref 0.6–1.3)
DIAGNOSIS, 93000: NORMAL
DIFFERENTIAL METHOD BLD: ABNORMAL
EOSINOPHIL # BLD: 0 K/UL (ref 0–0.4)
EOSINOPHIL NFR BLD: 0 % (ref 0–5)
EPITH CASTS URNS QL MICRO: ABNORMAL /LPF (ref 0–5)
ERYTHROCYTE [DISTWIDTH] IN BLOOD BY AUTOMATED COUNT: 12.9 % (ref 11.6–14.5)
GLOBULIN SER CALC-MCNC: 3.4 G/DL (ref 2–4)
GLUCOSE SERPL-MCNC: 109 MG/DL (ref 74–99)
GLUCOSE UR STRIP.AUTO-MCNC: NEGATIVE MG/DL
HCT VFR BLD AUTO: 39.9 % (ref 36–48)
HGB BLD-MCNC: 13.3 G/DL (ref 13–16)
HGB UR QL STRIP: ABNORMAL
KETONES UR QL STRIP.AUTO: NEGATIVE MG/DL
LEUKOCYTE ESTERASE UR QL STRIP.AUTO: ABNORMAL
LIPASE SERPL-CCNC: 238 U/L (ref 73–393)
LYMPHOCYTES # BLD: 0.7 K/UL (ref 0.9–3.6)
LYMPHOCYTES NFR BLD: 7 % (ref 21–52)
MCH RBC QN AUTO: 31.4 PG (ref 24–34)
MCHC RBC AUTO-ENTMCNC: 33.3 G/DL (ref 31–37)
MCV RBC AUTO: 94.3 FL (ref 74–97)
MONOCYTES # BLD: 0.5 K/UL (ref 0.05–1.2)
MONOCYTES NFR BLD: 5 % (ref 3–10)
NEUTS SEG # BLD: 8.6 K/UL (ref 1.8–8)
NEUTS SEG NFR BLD: 88 % (ref 40–73)
NITRITE UR QL STRIP.AUTO: NEGATIVE
P-R INTERVAL, ECG05: 202 MS
PH UR STRIP: 5 [PH] (ref 5–8)
PLATELET # BLD AUTO: 109 K/UL (ref 135–420)
PMV BLD AUTO: 12 FL (ref 9.2–11.8)
POTASSIUM SERPL-SCNC: 4.1 MMOL/L (ref 3.5–5.5)
PROT SERPL-MCNC: 7.3 G/DL (ref 6.4–8.2)
PROT UR STRIP-MCNC: 30 MG/DL
Q-T INTERVAL, ECG07: 344 MS
QRS DURATION, ECG06: 98 MS
QTC CALCULATION (BEZET), ECG08: 406 MS
RBC # BLD AUTO: 4.23 M/UL (ref 4.7–5.5)
RBC #/AREA URNS HPF: ABNORMAL /HPF (ref 0–5)
SODIUM SERPL-SCNC: 143 MMOL/L (ref 136–145)
SP GR UR REFRACTOMETRY: 1.02 (ref 1–1.03)
UROBILINOGEN UR QL STRIP.AUTO: 1 EU/DL (ref 0.2–1)
VENTRICULAR RATE, ECG03: 84 BPM
WBC # BLD AUTO: 9.9 K/UL (ref 4.6–13.2)
WBC URNS QL MICRO: ABNORMAL /HPF (ref 0–4)

## 2021-03-02 PROCEDURE — 93005 ELECTROCARDIOGRAM TRACING: CPT

## 2021-03-02 PROCEDURE — 74011250636 HC RX REV CODE- 250/636: Performed by: PHYSICIAN ASSISTANT

## 2021-03-02 PROCEDURE — 85025 COMPLETE CBC W/AUTO DIFF WBC: CPT

## 2021-03-02 PROCEDURE — 83690 ASSAY OF LIPASE: CPT

## 2021-03-02 PROCEDURE — 74176 CT ABD & PELVIS W/O CONTRAST: CPT

## 2021-03-02 PROCEDURE — 80053 COMPREHEN METABOLIC PANEL: CPT

## 2021-03-02 PROCEDURE — 96374 THER/PROPH/DIAG INJ IV PUSH: CPT

## 2021-03-02 PROCEDURE — 99284 EMERGENCY DEPT VISIT MOD MDM: CPT

## 2021-03-02 PROCEDURE — 81001 URINALYSIS AUTO W/SCOPE: CPT

## 2021-03-02 PROCEDURE — 96376 TX/PRO/DX INJ SAME DRUG ADON: CPT

## 2021-03-02 PROCEDURE — 96375 TX/PRO/DX INJ NEW DRUG ADDON: CPT

## 2021-03-02 PROCEDURE — 87086 URINE CULTURE/COLONY COUNT: CPT

## 2021-03-02 RX ORDER — TAMSULOSIN HYDROCHLORIDE 0.4 MG/1
CAPSULE ORAL
Qty: 10 CAP | Refills: 0 | Status: SHIPPED | OUTPATIENT
Start: 2021-03-02 | End: 2021-03-07 | Stop reason: SDUPTHER

## 2021-03-02 RX ORDER — ONDANSETRON 4 MG/1
TABLET, ORALLY DISINTEGRATING ORAL
Qty: 10 TAB | Refills: 0 | Status: SHIPPED | OUTPATIENT
Start: 2021-03-02 | End: 2021-03-05 | Stop reason: ALTCHOICE

## 2021-03-02 RX ORDER — ONDANSETRON 2 MG/ML
4 INJECTION INTRAMUSCULAR; INTRAVENOUS
Status: COMPLETED | OUTPATIENT
Start: 2021-03-02 | End: 2021-03-02

## 2021-03-02 RX ORDER — HYDROCODONE BITARTRATE AND ACETAMINOPHEN 5; 325 MG/1; MG/1
1 TABLET ORAL
Qty: 8 TAB | Refills: 0 | Status: SHIPPED | OUTPATIENT
Start: 2021-03-02 | End: 2021-03-05

## 2021-03-02 RX ORDER — MORPHINE SULFATE 2 MG/ML
2 INJECTION, SOLUTION INTRAMUSCULAR; INTRAVENOUS
Status: COMPLETED | OUTPATIENT
Start: 2021-03-02 | End: 2021-03-02

## 2021-03-02 RX ADMIN — ONDANSETRON 4 MG: 2 INJECTION INTRAMUSCULAR; INTRAVENOUS at 10:56

## 2021-03-02 RX ADMIN — ONDANSETRON 4 MG: 2 INJECTION INTRAMUSCULAR; INTRAVENOUS at 13:07

## 2021-03-02 RX ADMIN — MORPHINE SULFATE 2 MG: 2 INJECTION, SOLUTION INTRAMUSCULAR; INTRAVENOUS at 13:07

## 2021-03-02 RX ADMIN — SODIUM CHLORIDE 1000 ML: 900 INJECTION, SOLUTION INTRAVENOUS at 11:30

## 2021-03-02 RX ADMIN — MORPHINE SULFATE 2 MG: 2 INJECTION, SOLUTION INTRAMUSCULAR; INTRAVENOUS at 10:57

## 2021-03-02 NOTE — ED PROVIDER NOTES
EMERGENCY DEPARTMENT HISTORY AND PHYSICAL EXAM    10:48 AM      Date: 3/2/2021  Patient Name: Kassi Veloz Sr.    History of Presenting Illness     Chief Complaint   Patient presents with    Flank Pain         History Provided By: Patient    Additional History (Context): Prince Crowder is a 79 y.o. male with hx of CAD, HLD, CVA, nephrolithiasis, and other noted PMH who presents with c/o L flank pain associated with nausea x 1 day. Pt notes pain is severe and constant. Pt denies fever/chills, chest pain, dyspnea, vomiting, diarrhea, dysuria, hematuria. Notes symptoms feel similar to previous kidney stones. Denies surgery for stones in the past. Did not take any medication for symptoms PTA. PCP: Neto Johnson MD    Current Outpatient Medications   Medication Sig Dispense Refill    tamsulosin (Flomax) 0.4 mg capsule Take 1 tab by mouth daily until kidney stone passes. 10 Cap 0    ondansetron (Zofran ODT) 4 mg disintegrating tablet Take 1-2 tablets every 6-8 hours as needed for nausea and vomiting. 10 Tab 0    HYDROcodone-acetaminophen (NORCO) 5-325 mg per tablet Take 1 Tab by mouth every six (6) hours as needed for Pain for up to 3 days. Max Daily Amount: 4 Tabs. 8 Tab 0    amLODIPine (NORVASC) 5 mg tablet       atorvastatin (LIPITOR) 80 mg tablet TAKE 1 TABLET BY MOUTH ONCE DAILY 90 Tab 3    clopidogreL (PLAVIX) 75 mg tab TAKE 1 TABLET BY MOUTH ONCE DAILY 90 Tab 3    nitroglycerin (NITROSTAT) 0.4 mg SL tablet 1 Tab by SubLINGual route as needed for Chest Pain (may repeat in 5 minutes time 2 if pain continues. ). 24 Tab 0    ketoconazole (NIZORAL) 2 % topical cream Apply  to affected area daily.  cyanocobalamin (VITAMIN B-12) 1,000 mcg tablet Take 1,000 mcg by mouth daily.  Cholecalciferol, Vitamin D3, 5,000 unit Tab Take 1 Tab by mouth daily.  losartan (COZAAR) 25 mg tablet Take 25 mg by mouth daily.         co-enzyme Q-10 (CO Q-10) 100 mg capsule Take 100 mg by mouth daily.        nitroglycerin (NITROSTAT) 0.4 mg SL tablet 0.4 mg by SubLINGual route every five (5) minutes as needed.  ascorbic acid (VITAMIN C) 500 mg tablet Take 500 mg by mouth daily. Past History     Past Medical History:  Past Medical History:   Diagnosis Date    Atherosclerotic heart disease     Blurred vision, left eye     resolution with cataract surgery    CAD (coronary artery disease)     Calculus of kidney     Carotid duplex ([facility] [address] 12/17/2015    Mild < 50% JAMAL stenosis. Chronic LICA occlusion. Probable significant RECA stenosis. Unchanged from study of 2/9/15.  Carotid stenosis     Chronic kidney disease, stage IV (severe) ([address] 12/11/2012    BUN and Creatinine 28/2.58     CVA (cerebral vascular accident) ([address] 2/22/05 & 2/23/05    secondary to totally occluded left internal carotid artery    Equivocal nuclear cardiac imaging test 12/29/2015    Low-intermediate risk. Inferobasal artifact vs prior injury. Possible mild inferobasal hypk. EF 53%. Positive EKG on EST. Ex time 8 mins 43 secs.  History of echocardiogram 02/23/2005    EF 50-55%. Basal-mid inferior hypk. Mild MR.    History of heart attack     Holter monitor, normal 05/20/2014    Benign 24-hour Holter study.  Hypercholesteremia     Ischemic heart disease     Myocardial infarction, inferior wall ([address] 1998    S/P cardiac catheterization 09/12/2002    LM patent. pLAD 75%. pCx 35%. OM1 45%. dRCA 70%. EF 50%.  Stroke [facility]) 2/2005    Vascular disease     Visceral arterial duplex 12/21/2012    No significant renal artery stenosis bilaterally. R kidney 10.4 cm. L kidney 10.1 cm. Bilateral intrinsic/med renal disease. Both renal veins patent.     Vitamin B 12 deficiency     with history of replacement       Past Surgical History:  Past Surgical History:   Procedure Laterality Date    HX APPENDECTOMY      HX BACK SURGERY      L5    HX CATARACT REMOVAL  9/2009    left    HX CATARACT REMOVAL 1/4/2012    right    HX CORONARY STENT PLACEMENT  1998    right coronary artery    HX HEART CATHETERIZATION  9/2002    HX HERNIA REPAIR      [place] SPINE SURGERY PROCEDURE UNLISTED      spinal fusion       Family History:  No family history on file. Social History:  Social History     Tobacco Use    Smoking status: Never Smoker    Smokeless tobacco: Never Used   Substance Use Topics    Alcohol use: No    Drug use: No       Allergies:  No Known Allergies      Review of Systems       Review of Systems   Constitutional: Negative for chills and fever. Respiratory: Negative for shortness of breath. Cardiovascular: Negative for chest pain. Gastrointestinal: Positive for nausea. Negative for abdominal pain and vomiting. Genitourinary: Positive for flank pain. Skin: Negative for rash. Neurological: Negative for weakness. All other systems reviewed and are negative. Physical Exam     Visit Vitals  BP (!) 156/90 (BP 1 Location: Left upper arm, BP Patient Position: At rest)   Pulse 78   Temp 97.8 °F (36.6 °C)   Resp 16   Ht 6' 1\" (1.854 m)   Wt 73.9 kg (163 lb)   SpO2 98%   BMI 21.51 kg/m²         Physical Exam  Vitals signs and nursing note reviewed. Constitutional:       General: He is not in acute distress. Appearance: Normal appearance. He is well-developed. He is not ill-appearing, toxic-appearing or diaphoretic. HENT:      Head: Normocephalic and atraumatic. Neck:      Musculoskeletal: Normal range of motion and neck supple. Cardiovascular:      Rate and Rhythm: Normal rate and regular rhythm. Heart sounds: Normal heart sounds. No murmur. No friction rub. No gallop. Pulmonary:      Effort: Pulmonary effort is normal. No respiratory distress. Breath sounds: Normal breath sounds. No wheezing or rales. Abdominal:      General: Abdomen is flat. There is no distension. Palpations: Abdomen is soft. Tenderness: There is no abdominal tenderness.  There is left CVA tenderness. There is no right CVA tenderness, guarding or rebound. Musculoskeletal: Normal range of motion. Skin:     General: Skin is warm. Findings: No rash. Neurological:      Mental Status: He is alert. Diagnostic Study Results     Labs -  Recent Results (from the past 12 hour(s))   URINALYSIS W/ RFLX MICROSCOPIC    Collection Time: 03/02/21 10:39 AM   Result Value Ref Range    Color YELLOW      Appearance CLOUDY      Specific gravity 1.017 1.005 - 1.030      pH (UA) 5.0 5.0 - 8.0      Protein 30 (A) NEG mg/dL    Glucose Negative NEG mg/dL    Ketone Negative NEG mg/dL    Bilirubin Negative NEG      Blood LARGE (A) NEG      Urobilinogen 1.0 0.2 - 1.0 EU/dL    Nitrites Negative NEG      Leukocyte Esterase TRACE (A) NEG     URINE MICROSCOPIC ONLY    Collection Time: 03/02/21 10:39 AM   Result Value Ref Range    WBC 5 to 10 0 - 4 /hpf    RBC TOO NUMEROUS TO COUNT 0 - 5 /hpf    Epithelial cells FEW 0 - 5 /lpf    Bacteria 1+ (A) NEG /hpf   CBC WITH AUTOMATED DIFF    Collection Time: 03/02/21 10:52 AM   Result Value Ref Range    WBC 9.9 4.6 - 13.2 K/uL    RBC 4.23 (L) 4.70 - 5.50 M/uL    HGB 13.3 13.0 - 16.0 g/dL    HCT 39.9 36.0 - 48.0 %    MCV 94.3 74.0 - 97.0 FL    MCH 31.4 24.0 - 34.0 PG    MCHC 33.3 31.0 - 37.0 g/dL    RDW 12.9 11.6 - 14.5 %    PLATELET 751 (L) 291 - 420 K/uL    MPV 12.0 (H) 9.2 - 11.8 FL    NEUTROPHILS 88 (H) 40 - 73 %    LYMPHOCYTES 7 (L) 21 - 52 %    MONOCYTES 5 3 - 10 %    EOSINOPHILS 0 0 - 5 %    BASOPHILS 0 0 - 2 %    ABS. NEUTROPHILS 8.6 (H) 1.8 - 8.0 K/UL    ABS. LYMPHOCYTES 0.7 (L) 0.9 - 3.6 K/UL    ABS. MONOCYTES 0.5 0.05 - 1.2 K/UL    ABS. EOSINOPHILS 0.0 0.0 - 0.4 K/UL    ABS.  BASOPHILS 0.0 0.0 - 0.1 K/UL    DF AUTOMATED     METABOLIC PANEL, COMPREHENSIVE    Collection Time: 03/02/21 10:52 AM   Result Value Ref Range    Sodium 143 136 - 145 mmol/L    Potassium 4.1 3.5 - 5.5 mmol/L    Chloride 111 100 - 111 mmol/L    CO2 25 21 - 32 mmol/L    Anion gap 7 3.0 - 18 mmol/L    Glucose 109 (H) 74 - 99 mg/dL    BUN 20 (H) 7.0 - 18 MG/DL    Creatinine 1.77 (H) 0.6 - 1.3 MG/DL    BUN/Creatinine ratio 11 (L) 12 - 20      GFR est AA 45 (L) >60 ml/min/1.73m2    GFR est non-AA 37 (L) >60 ml/min/1.73m2    Calcium 9.1 8.5 - 10.1 MG/DL    Bilirubin, total 1.1 (H) 0.2 - 1.0 MG/DL    ALT (SGPT) 25 16 - 61 U/L    AST (SGOT) 19 10 - 38 U/L    Alk. phosphatase 65 45 - 117 U/L    Protein, total 7.3 6.4 - 8.2 g/dL    Albumin 3.9 3.4 - 5.0 g/dL    Globulin 3.4 2.0 - 4.0 g/dL    A-G Ratio 1.1 0.8 - 1.7     LIPASE    Collection Time: 03/02/21 10:52 AM   Result Value Ref Range    Lipase 238 73 - 393 U/L       Radiologic Studies -   CT ABD PELV WO CONT   Final Result      1. Left ureteropelvic junction 5 mm calculus causing hydronephrosis. 2. Additional nonobstructing bilateral nephrolithiasis. 3. Renal cysts. Some of the left renal lesions are of indeterminate density and   can be characterized with renal protocol CT or MRI within without IV contrast,   or at least ultrasound to differentiate between complex cysts and solid nodules. 4. Stable prostate enlargement. 5. Diverticulosis. 6. Chronic arteriosclerosis. Aortic branch vessel stenoses suspected,   particularly at SMA origin. Medical Decision Making   I am the first provider for this patient. I reviewed the vital signs, available nursing notes, past medical history, past surgical history, family history and social history. Vital Signs-Reviewed the patient's vital signs. Pulse Oximetry Analysis -  98% on room air    EKG: Interpreted by the EP. Time Interpreted: 1050   Rate: 84   Rhythm: sinus rhythm with PVCs    Records Reviewed: Nursing Notes, Old Medical Records (Time of Review: 10:48 AM)    ED Course: Progress Notes, Reevaluation, and Consults:  12:40 PM: [facility] melissa.   Pt notes persistent pain, will re-dose medication and reassess  1:14 PM: Discussed care with Ashlyn Chandra on call for [facility]. Will discuss with Dr. Yoel Vann and call back. 1:35 PM: Updated patient, resting comfortably, no distress. 1:46 PM: Discussed care with [facility], who spoke with Dr. Yoel Vann. If pain controlled, pt ok to go home. Send urine culture. Will call him for follow-up this week. Reviewed results and plan with patient. Feels comfortable with discharge home. Discussed need for close outpatient follow-up with urology this week. Discussed strict return precautions, including fever, vomiting, or any other medical concerns. Pt in agreement with plan. Provider Notes (Medical Decision Making): 58-year-old male with history of nephrolithiasis who presents to the ED due to L flank pain associated with nausea x 1 day. Afebrile, nontoxic-appearing, looks well. No evidence of leukocytosis. Cr 1.77, no recent labs with which to compare, Cr 1.31 in 2016. CT abd/pelvis demonstrates left ureteropelvic junction 5 mm calculus. Consulted urology, who reviewed the case. Pain controlled, no distress. Will follow-up with urology as outpatient, strict return precautions for any new or worsening symptoms. Diagnosis     Clinical Impression:   1. Nephrolithiasis    2. Renal cyst    3. Diverticulosis    4. Arteriosclerosis    5.  Elevated serum creatinine        Disposition: home    Follow-up Information     Follow up With Specialties Details Why [facility]    [facility] EMERGENCY DEPT Emergency Medicine  If symptoms worsen [address] [facility]    Lorie Jennings MD Internal Medicine Schedule an appointment as soon as possible for a visit   [address] [facility]       [email or web address]  Schedule an appointment as soon as possible for a visit   [address]  [phone]           Patient's Medications   Start Taking    HYDROCODONE-ACETAMINOPHEN (NORCO) 5-325 MG PER TABLET    Take 1 Tab by mouth every six (6) hours as needed for Pain for up to 3 days. Max Daily Amount: 4 Tabs. ONDANSETRON (ZOFRAN ODT) 4 MG DISINTEGRATING TABLET    Take 1-2 tablets every 6-8 hours as needed for nausea and vomiting.    TAMSULOSIN (FLOMAX) 0.4 MG CAPSULE    Take 1 tab by mouth daily until kidney stone passes. Continue Taking    AMLODIPINE (NORVASC) 5 MG TABLET        ASCORBIC ACID (VITAMIN C) 500 MG TABLET    Take 500 mg by mouth daily. ATORVASTATIN (LIPITOR) 80 MG TABLET    TAKE 1 TABLET BY MOUTH ONCE DAILY    CHOLECALCIFEROL, VITAMIN D3, 5,000 UNIT TAB    Take 1 Tab by mouth daily. CLOPIDOGREL (PLAVIX) 75 MG TAB    TAKE 1 TABLET BY MOUTH ONCE DAILY    CO-ENZYME Q-10 (CO Q-10) 100 MG CAPSULE    Take 100 mg by mouth daily. CYANOCOBALAMIN (VITAMIN B-12) 1,000 MCG TABLET    Take 1,000 mcg by mouth daily. KETOCONAZOLE (NIZORAL) 2 % TOPICAL CREAM    Apply  to affected area daily. LOSARTAN (COZAAR) 25 MG TABLET    Take 25 mg by mouth daily. NITROGLYCERIN (NITROSTAT) 0.4 MG SL TABLET    0.4 mg by SubLINGual route every five (5) minutes as needed. NITROGLYCERIN (NITROSTAT) 0.4 MG SL TABLET    1 Tab by SubLINGual route as needed for Chest Pain (may repeat in 5 minutes time 2 if pain continues. ). These Medications have changed    No medications on file   Stop Taking    No medications on file       Dictation disclaimer:  Please note that this dictation was completed with [employer], the computer voice recognition software. Quite often unanticipated grammatical, syntax, homophones, and other interpretive errors are inadvertently transcribed by the computer software. Please disregard these errors. Please excuse any errors that have escaped final proofreading.

## 2021-03-02 NOTE — ED TRIAGE NOTES
Patient presents to ED with c/o left flank pain with nausea that woke him from his sleep around 0200 today. The pain is constant and sharp. PMH of stones, of which he has been able to pass on his own. VSS.

## 2021-03-02 NOTE — DISCHARGE INSTRUCTIONS
Take medication as prescribed. Follow-up with the urologist within 1 week for reassessment. Bring the results from this visit with you for their review. Return to the ED immediately for any new, worsening, or persistent symptoms, including fever, vomiting, increased pain, or any other medical concerns.

## 2021-03-04 LAB
BACTERIA SPEC CULT: NORMAL
SERVICE CMNT-IMP: NORMAL

## 2021-03-16 ENCOUNTER — APPOINTMENT (OUTPATIENT)
Dept: MRI IMAGING | Age: 81
End: 2021-03-16
Attending: EMERGENCY MEDICINE
Payer: MEDICARE

## 2021-03-16 ENCOUNTER — HOSPITAL ENCOUNTER (EMERGENCY)
Age: 81
Discharge: ANOTHER HOSPITAL | End: 2021-03-16
Attending: EMERGENCY MEDICINE
Payer: MEDICARE

## 2021-03-16 ENCOUNTER — APPOINTMENT (OUTPATIENT)
Dept: CT IMAGING | Age: 81
End: 2021-03-16
Attending: EMERGENCY MEDICINE
Payer: MEDICARE

## 2021-03-16 VITALS
RESPIRATION RATE: 18 BRPM | OXYGEN SATURATION: 97 % | HEART RATE: 75 BPM | DIASTOLIC BLOOD PRESSURE: 59 MMHG | TEMPERATURE: 98.4 F | SYSTOLIC BLOOD PRESSURE: 133 MMHG

## 2021-03-16 DIAGNOSIS — I60.9 SAH (SUBARACHNOID HEMORRHAGE) (HCC): ICD-10-CM

## 2021-03-16 DIAGNOSIS — I61.9 INTRAPARENCHYMAL HEMORRHAGE OF BRAIN (HCC): Primary | ICD-10-CM

## 2021-03-16 DIAGNOSIS — N17.9 AKI (ACUTE KIDNEY INJURY) (HCC): ICD-10-CM

## 2021-03-16 DIAGNOSIS — N20.1 LEFT URETERAL STONE: ICD-10-CM

## 2021-03-16 DIAGNOSIS — N23 URETERAL COLIC: ICD-10-CM

## 2021-03-16 LAB
ABO + RH BLD: NORMAL
ALBUMIN SERPL-MCNC: 3.5 G/DL (ref 3.4–5)
ALBUMIN/GLOB SERPL: 1 {RATIO} (ref 0.8–1.7)
ALP SERPL-CCNC: 57 U/L (ref 45–117)
ALT SERPL-CCNC: 17 U/L (ref 16–61)
ANION GAP SERPL CALC-SCNC: 7 MMOL/L (ref 3–18)
APPEARANCE UR: CLEAR
APTT PPP: 28.1 SEC (ref 23–36.4)
AST SERPL-CCNC: 11 U/L (ref 10–38)
BACTERIA URNS QL MICRO: ABNORMAL /HPF
BASOPHILS # BLD: 0 K/UL (ref 0–0.1)
BASOPHILS NFR BLD: 0 % (ref 0–2)
BILIRUB SERPL-MCNC: 0.8 MG/DL (ref 0.2–1)
BILIRUB UR QL: NEGATIVE
BLOOD GROUP ANTIBODIES SERPL: NORMAL
BUN SERPL-MCNC: 45 MG/DL (ref 7–18)
BUN/CREAT SERPL: 15 (ref 12–20)
CALCIUM SERPL-MCNC: 9 MG/DL (ref 8.5–10.1)
CHLORIDE SERPL-SCNC: 109 MMOL/L (ref 100–111)
CO2 SERPL-SCNC: 23 MMOL/L (ref 21–32)
COLOR UR: YELLOW
COVID-19 RAPID TEST, COVR: NOT DETECTED
CREAT SERPL-MCNC: 3.07 MG/DL (ref 0.6–1.3)
DIFFERENTIAL METHOD BLD: ABNORMAL
EOSINOPHIL # BLD: 0 K/UL (ref 0–0.4)
EOSINOPHIL NFR BLD: 1 % (ref 0–5)
EPITH CASTS URNS QL MICRO: ABNORMAL /LPF (ref 0–5)
ERYTHROCYTE [DISTWIDTH] IN BLOOD BY AUTOMATED COUNT: 12.6 % (ref 11.6–14.5)
GLOBULIN SER CALC-MCNC: 3.5 G/DL (ref 2–4)
GLUCOSE SERPL-MCNC: 86 MG/DL (ref 74–99)
GLUCOSE UR STRIP.AUTO-MCNC: NEGATIVE MG/DL
HCT VFR BLD AUTO: 36.5 % (ref 36–48)
HGB BLD-MCNC: 12.3 G/DL (ref 13–16)
HGB UR QL STRIP: ABNORMAL
INR PPP: 1.1 (ref 0.8–1.2)
KETONES UR QL STRIP.AUTO: 40 MG/DL
LACTATE BLD-SCNC: 0.88 MMOL/L (ref 0.4–2)
LEUKOCYTE ESTERASE UR QL STRIP.AUTO: NEGATIVE
LIPASE SERPL-CCNC: 169 U/L (ref 73–393)
LYMPHOCYTES # BLD: 0.5 K/UL (ref 0.9–3.6)
LYMPHOCYTES NFR BLD: 7 % (ref 21–52)
MCH RBC QN AUTO: 31.4 PG (ref 24–34)
MCHC RBC AUTO-ENTMCNC: 33.7 G/DL (ref 31–37)
MCV RBC AUTO: 93.1 FL (ref 74–97)
MONOCYTES # BLD: 0.5 K/UL (ref 0.05–1.2)
MONOCYTES NFR BLD: 6 % (ref 3–10)
NEUTS SEG # BLD: 6.6 K/UL (ref 1.8–8)
NEUTS SEG NFR BLD: 86 % (ref 40–73)
NITRITE UR QL STRIP.AUTO: NEGATIVE
PH UR STRIP: 5 [PH] (ref 5–8)
PLATELET # BLD AUTO: 140 K/UL (ref 135–420)
PMV BLD AUTO: 11.9 FL (ref 9.2–11.8)
POTASSIUM SERPL-SCNC: 4.8 MMOL/L (ref 3.5–5.5)
PROT SERPL-MCNC: 7 G/DL (ref 6.4–8.2)
PROT UR STRIP-MCNC: ABNORMAL MG/DL
PROTHROMBIN TIME: 14.2 SEC (ref 11.5–15.2)
RBC # BLD AUTO: 3.92 M/UL (ref 4.7–5.5)
RBC #/AREA URNS HPF: ABNORMAL /HPF (ref 0–5)
SODIUM SERPL-SCNC: 139 MMOL/L (ref 136–145)
SOURCE, COVRS: NORMAL
SP GR UR REFRACTOMETRY: 1.02 (ref 1–1.03)
SPECIMEN EXP DATE BLD: NORMAL
UROBILINOGEN UR QL STRIP.AUTO: 1 EU/DL (ref 0.2–1)
WBC # BLD AUTO: 7.7 K/UL (ref 4.6–13.2)
WBC URNS QL MICRO: ABNORMAL /HPF (ref 0–4)

## 2021-03-16 PROCEDURE — 80053 COMPREHEN METABOLIC PANEL: CPT

## 2021-03-16 PROCEDURE — 85025 COMPLETE CBC W/AUTO DIFF WBC: CPT

## 2021-03-16 PROCEDURE — 96375 TX/PRO/DX INJ NEW DRUG ADDON: CPT

## 2021-03-16 PROCEDURE — 83690 ASSAY OF LIPASE: CPT

## 2021-03-16 PROCEDURE — 87086 URINE CULTURE/COLONY COUNT: CPT

## 2021-03-16 PROCEDURE — 96374 THER/PROPH/DIAG INJ IV PUSH: CPT

## 2021-03-16 PROCEDURE — 74011250636 HC RX REV CODE- 250/636: Performed by: PHYSICIAN ASSISTANT

## 2021-03-16 PROCEDURE — 96361 HYDRATE IV INFUSION ADD-ON: CPT

## 2021-03-16 PROCEDURE — 74011250636 HC RX REV CODE- 250/636

## 2021-03-16 PROCEDURE — 74011250636 HC RX REV CODE- 250/636: Performed by: EMERGENCY MEDICINE

## 2021-03-16 PROCEDURE — 70450 CT HEAD/BRAIN W/O DYE: CPT

## 2021-03-16 PROCEDURE — 74011000258 HC RX REV CODE- 258: Performed by: EMERGENCY MEDICINE

## 2021-03-16 PROCEDURE — 74011000250 HC RX REV CODE- 250: Performed by: PHYSICIAN ASSISTANT

## 2021-03-16 PROCEDURE — 83605 ASSAY OF LACTIC ACID: CPT

## 2021-03-16 PROCEDURE — 99285 EMERGENCY DEPT VISIT HI MDM: CPT

## 2021-03-16 PROCEDURE — 81001 URINALYSIS AUTO W/SCOPE: CPT

## 2021-03-16 PROCEDURE — 87040 BLOOD CULTURE FOR BACTERIA: CPT

## 2021-03-16 PROCEDURE — 86900 BLOOD TYPING SEROLOGIC ABO: CPT

## 2021-03-16 PROCEDURE — 70551 MRI BRAIN STEM W/O DYE: CPT

## 2021-03-16 PROCEDURE — 87635 SARS-COV-2 COVID-19 AMP PRB: CPT

## 2021-03-16 PROCEDURE — 74176 CT ABD & PELVIS W/O CONTRAST: CPT

## 2021-03-16 PROCEDURE — 85610 PROTHROMBIN TIME: CPT

## 2021-03-16 PROCEDURE — 85730 THROMBOPLASTIN TIME PARTIAL: CPT

## 2021-03-16 RX ORDER — SODIUM CHLORIDE 9 MG/ML
125 INJECTION, SOLUTION INTRAVENOUS CONTINUOUS
Status: DISCONTINUED | OUTPATIENT
Start: 2021-03-16 | End: 2021-03-16 | Stop reason: HOSPADM

## 2021-03-16 RX ORDER — DEXAMETHASONE SODIUM PHOSPHATE 4 MG/ML
4 INJECTION, SOLUTION INTRA-ARTICULAR; INTRALESIONAL; INTRAMUSCULAR; INTRAVENOUS; SOFT TISSUE
Status: COMPLETED | OUTPATIENT
Start: 2021-03-16 | End: 2021-03-16

## 2021-03-16 RX ORDER — LEVETIRACETAM 10 MG/ML
1000 INJECTION INTRAVASCULAR EVERY 12 HOURS
Status: DISCONTINUED | OUTPATIENT
Start: 2021-03-16 | End: 2021-03-16

## 2021-03-16 RX ORDER — SODIUM CHLORIDE 0.9 % (FLUSH) 0.9 %
5-10 SYRINGE (ML) INJECTION AS NEEDED
Status: DISCONTINUED | OUTPATIENT
Start: 2021-03-16 | End: 2021-03-16 | Stop reason: HOSPADM

## 2021-03-16 RX ORDER — MORPHINE SULFATE 2 MG/ML
2 INJECTION, SOLUTION INTRAMUSCULAR; INTRAVENOUS
Status: COMPLETED | OUTPATIENT
Start: 2021-03-16 | End: 2021-03-16

## 2021-03-16 RX ORDER — SODIUM CHLORIDE 9 MG/ML
250 INJECTION, SOLUTION INTRAVENOUS AS NEEDED
Status: DISCONTINUED | OUTPATIENT
Start: 2021-03-16 | End: 2021-03-16 | Stop reason: HOSPADM

## 2021-03-16 RX ORDER — LEVETIRACETAM 10 MG/ML
1000 INJECTION INTRAVASCULAR ONCE
Status: COMPLETED | OUTPATIENT
Start: 2021-03-16 | End: 2021-03-16

## 2021-03-16 RX ORDER — MORPHINE SULFATE 2 MG/ML
2 INJECTION, SOLUTION INTRAMUSCULAR; INTRAVENOUS
Status: DISCONTINUED | OUTPATIENT
Start: 2021-03-16 | End: 2021-03-16 | Stop reason: HOSPADM

## 2021-03-16 RX ADMIN — SODIUM CHLORIDE 125 ML/HR: 900 INJECTION, SOLUTION INTRAVENOUS at 21:20

## 2021-03-16 RX ADMIN — MORPHINE SULFATE 2 MG: 2 INJECTION, SOLUTION INTRAMUSCULAR; INTRAVENOUS at 11:58

## 2021-03-16 RX ADMIN — LEVETIRACETAM 1000 MG: 1000 INJECTION, SOLUTION INTRAVENOUS at 18:23

## 2021-03-16 RX ADMIN — SODIUM CHLORIDE 1000 ML: 900 INJECTION, SOLUTION INTRAVENOUS at 11:58

## 2021-03-16 RX ADMIN — WATER 1 G: 1 INJECTION INTRAMUSCULAR; INTRAVENOUS; SUBCUTANEOUS at 18:23

## 2021-03-16 RX ADMIN — DESMOPRESSIN ACETATE 22.2 MCG: 4 SOLUTION INTRAVENOUS at 18:53

## 2021-03-16 RX ADMIN — DEXAMETHASONE SODIUM PHOSPHATE 4 MG: 4 INJECTION, SOLUTION INTRAMUSCULAR; INTRAVENOUS at 18:24

## 2021-03-16 NOTE — ED TRIAGE NOTES
Per medic: Patient called complaining of lower back pain. Patient states his back pain has been increasing and he is finding it difficult to ambulate.  Upon arrival to the ER patient continued to complain of back pain

## 2021-03-16 NOTE — PROGRESS NOTES
MRI Screening form needs to be filled out and faxed to [phone] MRI can be scheduled. If unable to obtain information from pt, [facility] needs to be contacted.  If pt is claustro or will need pain meds, please have ordered in advance in order to facilitate exam.

## 2021-03-16 NOTE — CONSULTS
No diagnosis found. ASSESSMENT:   Bilateral Stones- Left 6 x7 mm distal left ureter, 8mm stone in lower pole, 3mm in mid pole and Right 2 x3 mm calculus in mid right ureter, 8mm in upper pole, 4mm in mid pole   WBC 7.7   UA 3/16: WBC 2-4, Bacteria +1, Negative   UCX 3/16: Pending   Creat: 3.07 (baseline 1.1- 1. 3)   Tmax 97, VSS    Left Moderate Hydro 2/2 to left Kidney stone    H/o CKD 3    ICH on CT    PLAN:   Will wait on cysto with RGP and Left JJ stent placement today d/t ICH. Per Medicine, waiting to see if patient can be treated here or will need to be transferred for [address]. Follow CX and VS.  Will see patient daily. Follow up arranged? NO      Colonel Shirley TORRES  [facility]  Available M-FriJackie  Pager: [phone]        Chief Complaint   Patient presents with    Back Pain       HISTORY OF PRESENT ILLNESS:  Rowan Cope is a 79 y.o. male who is seen in consultation as referred by Dr. Kiara Winters for left flank pain/left CVAT, kidney stones. Patient last seen by Dr. Man Huff on 3/5/15 for kidney stones. He was told to have trial of MET and return in 2-4 weeks if stone has not passed. Patient previously went to  ED on 3/2/21 for the same complaint. Previous urological history significant for prior kidney stones- required stent. Patient with past medical history significant for CAD, HLD, CKD, stroke and diverticulosis. He presents to ED with left flank pain and lower back pain. He reports the pain as on-going for a couple of weeks. Pain comes and goes and radiated to midline of back. He has had chills but denies fevers. Denies N/V, hematuria, frequency, urgency. He denies any AC even though he has has stents placed for his heart. Denies lack of appetite. Patient last ate at 5:30 PM last night. Per daughter, patient has been in sever pain for the last weeks, with weight loss. She says he is in the ED after falling this AM d/t difficulty walking from his severe back pain.         No flowsheet data found. Past Medical History:   Diagnosis Date    Atherosclerotic heart disease     Blurred vision, left eye     resolution with cataract surgery    CAD (coronary artery disease)     Calculus of kidney     Carotid duplex ([facility] 12/17/2015    Mild < 50% JAMAL stenosis. Chronic LICA occlusion. Probable significant RECA stenosis. Unchanged from study of 2/9/15.  Carotid stenosis     Chronic kidney disease, stage IV (severe) ([address] 12/11/2012    BUN and Creatinine 28/2.58     CVA (cerebral vascular accident) ([facility] [address] 2/22/05 & 2/23/05    secondary to totally occluded left internal carotid artery    Diverticulosis     Equivocal nuclear cardiac imaging test 12/29/2015    Low-intermediate risk. Inferobasal artifact vs prior injury. Possible mild inferobasal hypk. EF 53%. Positive EKG on EST. Ex time 8 mins 43 secs.  History of echocardiogram 02/23/2005    EF 50-55%. Basal-mid inferior hypk. Mild MR.    History of heart attack     Holter monitor, normal 05/20/2014    Benign 24-hour Holter study.  Hypercholesteremia     Ischemic heart disease     Myocardial infarction, inferior wall ([address] 1998    S/P cardiac catheterization 09/12/2002    LM patent. pLAD 75%. pCx 35%. OM1 45%. dRCA 70%. EF 50%.  Stroke [facility]) 2/2005    Vascular disease     Visceral arterial duplex 12/21/2012    No significant renal artery stenosis bilaterally. R kidney 10.4 cm. L kidney 10.1 cm. Bilateral intrinsic/med renal disease. Both renal veins patent.     Vitamin B 12 deficiency     with history of replacement       Past Surgical History:   Procedure Laterality Date    HX APPENDECTOMY      HX BACK SURGERY      L5- When he was in high school- [facility] CATARACT REMOVAL  9/2009    left    HX CATARACT REMOVAL  1/4/2012    right    [address]    right coronary artery    HX HEART CATHETERIZATION  9/2002    HX HERNIA REPAIR      OR SPINE SURGERY PROCEDURE UNLISTED spinal fusion       Social History     Tobacco Use    Smoking status: Never Smoker    Smokeless tobacco: Never Used   Substance Use Topics    Alcohol use: No    Drug use: Never       No Known Allergies    Family History   Problem Relation Age of Onset    Heart Attack Mother     Heart Attack Father     Kidney Disease Brother     Stroke Brother     Hypertension Brother     Diabetes Brother        Current Facility-Administered Medications   Medication Dose Route Frequency Provider Last Rate Last Admin    0.9% sodium chloride infusion  125 mL/hr IntraVENous CONTINUOUS Lor Grove MD         Current Outpatient Medications   Medication Sig Dispense Refill    tamsulosin (Flomax) 0.4 mg capsule Take 1 tab by mouth daily until kidney stone passes. Indications: stones in the urinary tract 30 Cap 1    losartan (COZAAR) 50 mg tablet Take  by mouth daily.  amLODIPine (NORVASC) 5 mg tablet       atorvastatin (LIPITOR) 80 mg tablet TAKE 1 TABLET BY MOUTH ONCE DAILY 90 Tab 3    clopidogreL (PLAVIX) 75 mg tab TAKE 1 TABLET BY MOUTH ONCE DAILY 90 Tab 3    cyanocobalamin (VITAMIN B-12) 1,000 mcg tablet Take 1,000 mcg by mouth daily.  Cholecalciferol, Vitamin D3, 5,000 unit Tab Take 1 Tab by mouth daily.  co-enzyme Q-10 (CO Q-10) 100 mg capsule Take 100 mg by mouth daily.  ascorbic acid (VITAMIN C) 500 mg tablet Take 500 mg by mouth daily. Review of Systems  ROS is:    Negative for: Ophthalmologic issues, ENT issues, Cardiovascular issues, respiratory issues, GI issues, neurologic issues, hematoogic issues, skin lesions, musculoskeletal issues, psychiatric issues  Exceptions: yes    Positive for:    Left back pain          PHYSICAL EXAMINATION:   Visit Vitals  BP (!) 154/66 (BP 1 Location: Left upper arm, BP Patient Position: At rest)   Pulse 70   Temp 97 °F (36.1 °C)   Resp 16   SpO2 100%     Constitutional: Well developed, well nourished male. No acute distress. HEENT: Normocephalic, Atraumatic, EOM's intact   CV:  no edema  Respiratory: No respiratory distress or difficulties breathing   Abdomen:  soft and non tender    Male:  Left CVA tenderness  Skin: No evidence of jaundice. Normal color  Neuro/Psych:  Alert and oriented. Affect appropriate. REVIEW OF LABS AND IMAGING:      CT 3/16/21:    IMPRESSION   1. 6 x 7 mm obstructing calculus in the very distal left ureter next to the UVJ   with moderate hydroureter, hydronephrosis and caliectasis. 2. No obstructive calculi also seen in bilateral kidneys as above. 2 adjacent   cortical cysts in left kidney. 3. Moderate hiatal hernia. Sigmoid diverticulosis. Other benign incidental   findings as above. Labs: Results:   Chemistry    Recent Labs     03/16/21  1205   GLU 86      K 4.8      CO2 23   BUN 45*   CREA 3.07*   CA 9.0   AGAP 7   BUCR 15   AP 57   TP 7.0   ALB 3.5   GLOB 3.5   AGRAT 1.0      CBC w/Diff Recent Labs     03/16/21  1205   WBC 7.7   RBC 3.92*   HGB 12.3*   HCT 36.5      GRANS 86*   LYMPH 7*   EOS 1      Cultures No results for input(s): CULT in the last 72 hours.   All Micro Results     Procedure Component Value Units Date/Time    CULTURE, URINE [[id number]] Collected: 03/16/21 1208    Order Status: Completed Specimen: Clean catch Updated: 03/16/21 1216            Urinalysis Color   Date Value Ref Range Status   03/16/2021 YELLOW   Final     Appearance   Date Value Ref Range Status   03/16/2021 CLEAR   Final     Specific gravity   Date Value Ref Range Status   03/16/2021 1.017 1.005 - 1.030   Final     pH (UA)   Date Value Ref Range Status   03/16/2021 5.0 5.0 - 8.0   Final     Protein   Date Value Ref Range Status   03/16/2021 TRACE (A) NEG mg/dL Final     Ketone   Date Value Ref Range Status   03/16/2021 40 (A) NEG mg/dL Final     Bilirubin   Date Value Ref Range Status   03/16/2021 Negative NEG   Final     Blood   Date Value Ref Range Status   03/16/2021 SMALL (A) NEG Final     Urobilinogen   Date Value Ref Range Status   03/16/2021 1.0 0.2 - 1.0 EU/dL Final     Nitrites   Date Value Ref Range Status   03/16/2021 Negative NEG   Final     Leukocyte Esterase   Date Value Ref Range Status   03/16/2021 Negative NEG   Final     Potassium   Date Value Ref Range Status   03/16/2021 4.8 3.5 - 5.5 mmol/L Final     Creatinine   Date Value Ref Range Status   03/16/2021 3.07 (H) 0.6 - 1.3 MG/DL Final     BUN   Date Value Ref Range Status   03/16/2021 45 (H) 7.0 - 18 MG/DL Final      PSA No results for input(s): PSA in the last 72 hours.    Coagulation Lab Results   Component Value Date/Time    Prothrombin time 14.2 11/27/2016 04:40 AM    INR 1.1 11/27/2016 04:40 AM

## 2021-03-16 NOTE — ED PROVIDER NOTES
EMERGENCY DEPARTMENT HISTORY AND PHYSICAL EXAM    10:50 AM      Date: 3/16/2021  Patient Name: Della Arnett    History of Presenting Illness     Chief Complaint   Patient presents with    Back Pain         History Provided By: Patient  Location/Duration/Severity/Modifying factors   Patient is an 79-year-old male with a history of vascular disease, dyslipidemia, coronary disease, chronic kidney disease, stroke, diverticulosis, and most recently left ureteral stone, the presents emergency department with persistent but increasing L flank and low back pain. Patient has been to see the urologist and was told that it should subside over time and has a follow-up in the next several weeks. Patient denies any fevers or chills however his family is noticed that he has been declining, not eating as much, and complaining of increasing pain. Patient denies any blood in his stool or vomitus. Patient denies any lightheadedness. Patient denies any chest pain or shortness of breath. Patient has been able to stand and walk however says he feels weaker than usual.  Patient denies any other aggravating or alleviating factors. Patient denies taking medications for his pain. 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           PCP: Asha Bonilla MD    Current Facility-Administered Medications   Medication Dose Route Frequency Provider Last Rate Last Admin    0.9% sodium chloride infusion  125 mL/hr IntraVENous CONTINUOUS Kinga Valdivia MD        morphine injection 2 mg  2 mg IntraVENous NOW Kinga Valdivia MD        cefTRIAXone (ROCEPHIN) 1 g in sterile water (preservative free) 10 mL IV syringe  1 g IntraVENous Q24H [place], [place]        sodium chloride (NS) flush 5-10 mL  5-10 mL IntraVENous PRN Kinga Valdivia MD        levETIRAcetam in saline (iso-os) (KEPPRA) infusion 1,000 mg  1,000 mg IntraVENous ONCE Kinga Valdivia MD        desmopressin (DDAVP) 22.2 mcg in 0.9% sodium chloride 50 mL IVPB  0.3 mcg/kg (Order-Specific) IntraVENous ONCE Aurora Borja MD        0.9% sodium chloride infusion 250 mL  250 mL IntraVENous PRN Iwona Baig MD        dexamethasone (DECADRON) 4 mg/mL injection 4 mg  4 mg IntraVENous NOW Iwona Baig MD         Current Outpatient Medications   Medication Sig Dispense Refill    tamsulosin (Flomax) 0.4 mg capsule Take 1 tab by mouth daily until kidney stone passes. Indications: stones in the urinary tract 30 Cap 1    losartan (COZAAR) 50 mg tablet Take  by mouth daily.  amLODIPine (NORVASC) 5 mg tablet       atorvastatin (LIPITOR) 80 mg tablet TAKE 1 TABLET BY MOUTH ONCE DAILY 90 Tab 3    clopidogreL (PLAVIX) 75 mg tab TAKE 1 TABLET BY MOUTH ONCE DAILY 90 Tab 3    cyanocobalamin (VITAMIN B-12) 1,000 mcg tablet Take 1,000 mcg by mouth daily.  Cholecalciferol, Vitamin D3, 5,000 unit Tab Take 1 Tab by mouth daily.  co-enzyme Q-10 (CO Q-10) 100 mg capsule Take 100 mg by mouth daily.  ascorbic acid (VITAMIN C) 500 mg tablet Take 500 mg by mouth daily. Past History     Past Medical History:  Past Medical History:   Diagnosis Date    Atherosclerotic heart disease     Blurred vision, left eye     resolution with cataract surgery    CAD (coronary artery disease)     Calculus of kidney     Carotid duplex ([facility] 12/17/2015    Mild < 50% JAMAL stenosis. Chronic LICA occlusion. Probable significant RECA stenosis. Unchanged from study of 2/9/15.  Carotid stenosis     Chronic kidney disease, stage IV (severe) ([address] 12/11/2012    BUN and Creatinine 28/2.58     CVA (cerebral vascular accident) ([address] 2/22/05 & 2/23/05    secondary to totally occluded left internal carotid artery    Diverticulosis     Equivocal nuclear cardiac imaging test 12/29/2015    Low-intermediate risk. Inferobasal artifact vs prior injury. Possible mild inferobasal hypk. EF 53%. Positive EKG on EST.   Ex time 8 mins 43 secs.  History of echocardiogram 02/23/2005    EF 50-55%. Basal-mid inferior hypk. Mild MR.    History of heart attack     Holter monitor, normal 05/20/2014    Benign 24-hour Holter study.  Hypercholesteremia     Ischemic heart disease     Myocardial infarction, inferior wall ([address] 1998    S/P cardiac catheterization 09/12/2002    LM patent. pLAD 75%. pCx 35%. OM1 45%. dRCA 70%. EF 50%.  Stroke [facility]) 2/2005    Vascular disease     Visceral arterial duplex 12/21/2012    No significant renal artery stenosis bilaterally. R kidney 10.4 cm. L kidney 10.1 cm. Bilateral intrinsic/med renal disease. Both renal veins patent.  Vitamin B 12 deficiency     with history of replacement       Past Surgical History:  Past Surgical History:   Procedure Laterality Date    HX APPENDECTOMY      HX BACK SURGERY      L5- When he was in high school- [facility] CATARACT REMOVAL  9/2009    left    HX CATARACT REMOVAL  1/4/2012    right    HX CORONARY [address] [facility]    right coronary artery    HX HEART CATHETERIZATION  9/2002    HX HERNIA REPAIR      FL SPINE SURGERY PROCEDURE UNLISTED      spinal fusion       Family History:  Family History   Problem Relation Age of Onset    Heart Attack Mother     Heart Attack Father     Kidney Disease Brother     Stroke Brother     Hypertension Brother     Diabetes Brother        Social History:  Social History     Tobacco Use    Smoking status: Never Smoker    Smokeless tobacco: Never Used   Substance Use Topics    Alcohol use: No    Drug use: Never       Allergies:  No Known Allergies      Review of Systems       Review of Systems   Constitutional: Positive for activity change and fatigue. Negative for fever. HENT: Negative for congestion and rhinorrhea. Eyes: Negative for visual disturbance. Respiratory: Negative for shortness of breath. Cardiovascular: Negative for chest pain and palpitations.    Gastrointestinal: Negative for abdominal pain, diarrhea, nausea and vomiting. Genitourinary: Positive for flank pain. Negative for dysuria and hematuria. Musculoskeletal: Positive for back pain. Skin: Negative for rash. Neurological: Negative for dizziness, weakness and light-headedness. All other systems reviewed and are negative. Physical Exam     Visit Vitals  BP (!) 154/66 (BP 1 Location: Left upper arm, BP Patient Position: At rest)   Pulse 70   Temp 97 °F (36.1 °C)   Resp 16   SpO2 100%         Physical Exam  Vitals signs and nursing note reviewed. Constitutional:       General: He is not in acute distress. Appearance: He is well-developed. HENT:      Head: Normocephalic and atraumatic. Right Ear: External ear normal.      Left Ear: External ear normal.      Nose: Nose normal.   Eyes:      General: No scleral icterus. Conjunctiva/sclera: Conjunctivae normal.      Pupils: Pupils are equal, round, and reactive to light. Neck:      Musculoskeletal: Normal range of motion and neck supple. Thyroid: No thyromegaly. Vascular: No JVD. Trachea: No tracheal deviation. Cardiovascular:      Rate and Rhythm: Normal rate and regular rhythm. Heart sounds: Normal heart sounds. No murmur. No friction rub. No gallop. Pulmonary:      Effort: Pulmonary effort is normal.      Breath sounds: Normal breath sounds. Chest:      Chest wall: No tenderness. Abdominal:      General: Bowel sounds are normal. There is no distension. Palpations: Abdomen is soft. Tenderness: There is no abdominal tenderness. There is no guarding or rebound. Comments: Mild left CVA tenderness   Musculoskeletal: Normal range of motion. General: No tenderness. Comments: Left paraspinal lumbar tenderness, no midline tenderness   Lymphadenopathy:      Cervical: No cervical adenopathy. Skin:     General: Skin is warm and dry.    Neurological:      Mental Status: He is alert and oriented to person, place, and time. Cranial Nerves: No cranial nerve deficit. Coordination: Coordination normal.      Comments: No sensory loss, Gait normal, Motor 5/5   Psychiatric:         Judgment: Judgment normal.      Comments: Has family support, lives alone           Diagnostic Study Results     Labs -  Recent Results (from the past 12 hour(s))   CBC WITH AUTOMATED DIFF    Collection Time: 03/16/21 12:05 PM   Result Value Ref Range    WBC 7.7 4.6 - 13.2 K/uL    RBC 3.92 (L) 4.70 - 5.50 M/uL    HGB 12.3 (L) 13.0 - 16.0 g/dL    HCT 36.5 36.0 - 48.0 %    MCV 93.1 74.0 - 97.0 FL    MCH 31.4 24.0 - 34.0 PG    MCHC 33.7 31.0 - 37.0 g/dL    RDW 12.6 11.6 - 14.5 %    PLATELET 421 339 - 645 K/uL    MPV 11.9 (H) 9.2 - 11.8 FL    NEUTROPHILS 86 (H) 40 - 73 %    LYMPHOCYTES 7 (L) 21 - 52 %    MONOCYTES 6 3 - 10 %    EOSINOPHILS 1 0 - 5 %    BASOPHILS 0 0 - 2 %    ABS. NEUTROPHILS 6.6 1.8 - 8.0 K/UL    ABS. LYMPHOCYTES 0.5 (L) 0.9 - 3.6 K/UL    ABS. MONOCYTES 0.5 0.05 - 1.2 K/UL    ABS. EOSINOPHILS 0.0 0.0 - 0.4 K/UL    ABS. BASOPHILS 0.0 0.0 - 0.1 K/UL    DF AUTOMATED     METABOLIC PANEL, COMPREHENSIVE    Collection Time: 03/16/21 12:05 PM   Result Value Ref Range    Sodium 139 136 - 145 mmol/L    Potassium 4.8 3.5 - 5.5 mmol/L    Chloride 109 100 - 111 mmol/L    CO2 23 21 - 32 mmol/L    Anion gap 7 3.0 - 18 mmol/L    Glucose 86 74 - 99 mg/dL    BUN 45 (H) 7.0 - 18 MG/DL    Creatinine 3.07 (H) 0.6 - 1.3 MG/DL    BUN/Creatinine ratio 15 12 - 20      GFR est AA 24 (L) >60 ml/min/1.73m2    GFR est non-AA 20 (L) >60 ml/min/1.73m2    Calcium 9.0 8.5 - 10.1 MG/DL    Bilirubin, total 0.8 0.2 - 1.0 MG/DL    ALT (SGPT) 17 16 - 61 U/L    AST (SGOT) 11 10 - 38 U/L    Alk.  phosphatase 57 45 - 117 U/L    Protein, total 7.0 6.4 - 8.2 g/dL    Albumin 3.5 3.4 - 5.0 g/dL    Globulin 3.5 2.0 - 4.0 g/dL    A-G Ratio 1.0 0.8 - 1.7     LIPASE    Collection Time: 03/16/21 12:05 PM   Result Value Ref Range    Lipase 169 73 - 393 U/L   PROTHROMBIN TIME + INR    Collection Time: 03/16/21 12:05 PM   Result Value Ref Range    Prothrombin time 14.2 11.5 - 15.2 sec    INR 1.1 0.8 - 1.2     PTT    Collection Time: 03/16/21 12:05 PM   Result Value Ref Range    aPTT 28.1 23.0 - 36.4 SEC   URINALYSIS W/ RFLX MICROSCOPIC    Collection Time: 03/16/21 12:08 PM   Result Value Ref Range    Color YELLOW      Appearance CLEAR      Specific gravity 1.017 1.005 - 1.030      pH (UA) 5.0 5.0 - 8.0      Protein TRACE (A) NEG mg/dL    Glucose Negative NEG mg/dL    Ketone 40 (A) NEG mg/dL    Bilirubin Negative NEG      Blood SMALL (A) NEG      Urobilinogen 1.0 0.2 - 1.0 EU/dL    Nitrites Negative NEG      Leukocyte Esterase Negative NEG     URINE MICROSCOPIC ONLY    Collection Time: 03/16/21 12:08 PM   Result Value Ref Range    WBC 2 to 4 0 - 4 /hpf    RBC 4 to 6 0 - 5 /hpf    Epithelial cells FEW 0 - 5 /lpf    Bacteria 1+ (A) NEG /hpf       Radiologic Studies -   MRI BRAIN WO CONT   Final Result         Small volume of subarachnoid hemorrhages at the bilateral cerebral convexities   with additional small subcortical hemorrhage in the parasagittal left frontal   lobe. Probably a combination of subarachnoid and intraparenchymal hemorrhage at   the left parietal vertex. Focal subarachnoid clot in between the left cingulate   gyrus and corpus callosum. In addition to the acute/early subacute hemorrhages described above, there is   also evidence of chronic superficial hemosiderosis and numerous chronic   intraparenchymal microhemorrhages. 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 Small symmetric bilateral subdural hygromas. No subsequent midline shift. Chronic infarct at the left temporo-occipital junction. No acute ischemic   infarct. Absent LICA flow void suggesting either occlusion or pathologic flow due to a   more proximal lesion. CT HEAD WO CONT   Final Result      1.  Multiple small hemorrhagic foci in bilateral frontal lobes, some are   intraparenchymal and others extra axial in location such as subarachnoid or   subdural at described above. Mild surrounding hypodensities could be vasogenic   edema or superimposed chronic white matter process such as small ischemic small   vessel disease. Regarding the unusual distribution, posttraumatic hematomas   rather than hemorrhagic lesions, primary versus metastatic are considered. Recommend clinical correlation and further evaluation by brain MR.       2. Bilateral subdural fluid collections with greater than normal CSF   attenuation, suggesting late subacute versus remote subdural hematomas/hygromas. No no mass effect, midline shift or skull fracture. 3. Small encephalomalacia from remote infarction in left parietal lobe. A wet read was provided to the ER physician, Dr. Belgica Humphries, by me upon the   interpretation at approximately  1259  hours. Thank you for your referral.      CT ABD PELV WO CONT   Final Result      1. 6 x 7 mm obstructing calculus in the very distal left ureter next to the UVJ   with moderate hydroureter, hydronephrosis and caliectasis. 2. No obstructive calculi also seen in bilateral kidneys as above. 2 adjacent   cortical cysts in left kidney. 3. Moderate hiatal hernia. Sigmoid diverticulosis. Other benign incidental   findings as above. Thank you for this referral.            Medical Decision Making   I am the first provider for this patient. I reviewed the vital signs, available nursing notes, past medical history, past surgical history, family history and social history. Vital Signs-Reviewed the patient's vital signs.       EKG:     Records Reviewed: Nursing Notes, Old Medical Records, Previous Radiology Studies and Previous Laboratory Studies (Time of Review: 10:50 AM)    ED Course: Progress Notes, Reevaluation, and Consults:      Extended Emergency Contact Information  Primary Emergency Contact: [address] [place] [address]  Phone: [phone]  Mobile Phone: [phone]  Relation: Child  Secondary Emergency Contact: Mihir Chapman Phone: [phone]  Relation: Daughter    I was able to meet with the patient's son and the patient has been declining steadily since being diagnosed with a kidney stone 3 weeks ago. Patient son is concerned that his pain has not been controlled and he has been unable to sleep and does not feel comfortable eating because the pain is uncontrolled. Patient son is concerned that the medication just is not cutting for the pain and the patient has been going to the urologist however was told he could take up to 3 to 4 weeks for the stones to pass. Since then the patient has lost approximately 12 pounds. The patient is usually pretty private about his health however has been open with his sister and son that he is just not feeling well. The patient lives alone and was working up until 3 weeks ago 2 days a week. The family's been calling urologist however had not been able to come to resolution so eventually decided to cut the patient come to the emergency department. The patient has been complaining of the same pain since the beginning of the onset of the symptoms. Entire family has kidney stones. The patient has persistent stone disease on the left UPJ and suspect that the source of his pain. I discussed case with [facility] [place] and will come to see the patient. At this point the patient has failed outpatient therapy and will likely need to be intervened upon. Lilian Hernandez,  12:34 PM    Received a call from radiology the patient is multiple areas of possible hemorrhagic focus on her CT scan. They recommend MRI with and without contrast to further clarify what is going on. Patient has been having pain in the back of his head and has been writhing around at home and will follow the MRI results. Lilian Hernandez,  1:09 PM    The MRI is done and Dr. Nj Cueva reviewed the MRI and shows the patient has multiple areas of hyperdensity suggestive of metastatic disease. Some of them have evidence of hemorrhage and recommends patient get transferred for neurosurgical evaluation and will need consideration for whole brain radiation. Aime Alarcon DO 4:49 PM    Discussed case with Dr. Madelaine Silver from the [facility] [place] [facility] and he was planning to take the stone out however would like to get a better idea of what the plan will be for his neurologic condition prior to taking him to the OR. Aime Alarcon, DO 5:05 PM    The patient's MRI was reviewed and found to have diffuse microvascular disease multiple areas of hemorrhage including some subarachnoid blood. There was no evidence of mass on the MRI report by the radiologist however was able to review the case with Dr. Liss Scott the neurosurgeon at [facility] and recommends giving the patient dexamethasone, 2 units of platelets, DDAVP, and then transfer the patient to the emergency department there. We will update the patient's family and proceed with transfer and Dr. Madelaine Silver the urologist is available for consultation at [facility] if needed. Aime Alarcon DO 6:03 PM        Provider Notes (Medical Decision Making):   MDM  Number of Diagnoses or Management Options  Diagnosis management comments: Patient is 25-year-old male with history of vascular disease, dyslipidemia, recent 5 mm left UPJ stone that presents emergency department with persistent left lower back and flank pain. The patient denies any dysuria, fevers, chills, or blood in his urine. We will repeat the CT scan to follow the progress of the stone as well as evaluate for any other causes of his pain. Also follow his basic labs, urinalysis, pain control as tolerated, and then reevaluate. Aime Alarcon DO 11:04 AM        Procedures    Critical Care Time: Critical Care Time:  The services I provided to this patient were to treat and/or prevent clinically significant deterioration that could result in the failure of one or more body systems and/or organ systems due to Intraparenchymal hemorrhage and SAH     Services included the following:  -reviewing nursing notes and old charts  -vital sign assessments  -direct patient care  -medication orders and management  -interpreting and reviewing diagnostic studies/labs  -re-evaluations  -documentation time    Aggregate critical care time was 65 minutes, which includes only time during which I was engaged in work directly related to the patient's care as described above, whether I was at bedside or elsewhere in the Emergency Department. It did not include time spent performing other reported procedures or the services of residents, students, nurses, or advance practice providers. Jamil Alas,  6:03 PM          Diagnosis     Clinical Impression:   1. Intraparenchymal hemorrhage of brain ([facility] [address]    2. SAH (subarachnoid hemorrhage) ([facility] [address]    3. Left ureteral stone    4. Ureteral colic    5. CHRISTY (acute kidney injury) ([facility] [address]        Disposition: Transfer     Follow-up Information    None          Patient's Medications   Start Taking    No medications on file   Continue Taking    AMLODIPINE (NORVASC) 5 MG TABLET        ASCORBIC ACID (VITAMIN C) 500 MG TABLET    Take 500 mg by mouth daily. ATORVASTATIN (LIPITOR) 80 MG TABLET    TAKE 1 TABLET BY MOUTH ONCE DAILY    CHOLECALCIFEROL, VITAMIN D3, 5,000 UNIT TAB    Take 1 Tab by mouth daily. CLOPIDOGREL (PLAVIX) 75 MG TAB    TAKE 1 TABLET BY MOUTH ONCE DAILY    CO-ENZYME Q-10 (CO Q-10) 100 MG CAPSULE    Take 100 mg by mouth daily. CYANOCOBALAMIN (VITAMIN B-12) 1,000 MCG TABLET    Take 1,000 mcg by mouth daily. LOSARTAN (COZAAR) 50 MG TABLET    Take  by mouth daily. TAMSULOSIN (FLOMAX) 0.4 MG CAPSULE    Take 1 tab by mouth daily until kidney stone passes.   Indications: stones in the urinary tract   These Medications have changed    No medications on file   Stop Taking    No medications on file     Disclaimer: Sections of this note are dictated using utilizing voice recognition software. Minor typographical errors may be present. If questions arise, please do not hesitate to contact me or call our department.

## 2021-03-17 PROBLEM — R10.9 LEFT FLANK PAIN: Status: ACTIVE | Noted: 2021-03-17

## 2021-03-17 PROBLEM — I62.9 INTRACRANIAL HEMORRHAGE (HCC): Status: ACTIVE | Noted: 2021-03-17

## 2021-03-17 PROBLEM — N20.1 LEFT URETERAL STONE: Status: ACTIVE | Noted: 2021-03-17

## 2021-03-17 LAB
BACTERIA SPEC CULT: NORMAL
SERVICE CMNT-IMP: NORMAL

## 2021-03-17 NOTE — ED NOTES
Report called [address] given to Jim Gibson RN. EMTALA completed and signed by all parties. Pt left facility via [facility].

## 2021-03-19 PROBLEM — N17.9 AKI (ACUTE KIDNEY INJURY) (HCC): Status: ACTIVE | Noted: 2021-03-19

## 2021-03-19 PROBLEM — N20.0 NEPHROLITHIASIS: Status: ACTIVE | Noted: 2021-03-19

## 2021-03-22 LAB
BACTERIA SPEC CULT: NORMAL
BACTERIA SPEC CULT: NORMAL
SERVICE CMNT-IMP: NORMAL
SERVICE CMNT-IMP: NORMAL

## 2021-04-08 ENCOUNTER — TRANSCRIBE ORDER (OUTPATIENT)
Dept: SCHEDULING | Age: 81
End: 2021-04-08

## 2021-04-08 DIAGNOSIS — I62.9 INTRACRANIAL HEMORRHAGE (HCC): Primary | ICD-10-CM

## 2021-04-12 ENCOUNTER — OFFICE VISIT (OUTPATIENT)
Dept: CARDIOLOGY CLINIC | Age: 81
End: 2021-04-12
Payer: MEDICARE

## 2021-04-12 VITALS
DIASTOLIC BLOOD PRESSURE: 54 MMHG | SYSTOLIC BLOOD PRESSURE: 102 MMHG | HEIGHT: 73 IN | OXYGEN SATURATION: 99 % | BODY MASS INDEX: 19.35 KG/M2 | WEIGHT: 146 LBS | HEART RATE: 70 BPM

## 2021-04-12 DIAGNOSIS — I65.22 STENOSIS OF LEFT CAROTID ARTERY: ICD-10-CM

## 2021-04-12 DIAGNOSIS — R09.89 BRUIT: Primary | ICD-10-CM

## 2021-04-12 DIAGNOSIS — R09.89 BRUIT: ICD-10-CM

## 2021-04-12 PROCEDURE — G8536 NO DOC ELDER MAL SCRN: HCPCS | Performed by: INTERNAL MEDICINE

## 2021-04-12 PROCEDURE — 1111F DSCHRG MED/CURRENT MED MERGE: CPT | Performed by: INTERNAL MEDICINE

## 2021-04-12 PROCEDURE — 1101F PT FALLS ASSESS-DOCD LE1/YR: CPT | Performed by: INTERNAL MEDICINE

## 2021-04-12 PROCEDURE — G8427 DOCREV CUR MEDS BY ELIG CLIN: HCPCS | Performed by: INTERNAL MEDICINE

## 2021-04-12 PROCEDURE — G8510 SCR DEP NEG, NO PLAN REQD: HCPCS | Performed by: INTERNAL MEDICINE

## 2021-04-12 PROCEDURE — 99214 OFFICE O/P EST MOD 30 MIN: CPT | Performed by: INTERNAL MEDICINE

## 2021-04-12 PROCEDURE — G8420 CALC BMI NORM PARAMETERS: HCPCS | Performed by: INTERNAL MEDICINE

## 2021-04-12 RX ORDER — MIRTAZAPINE 15 MG/1
TABLET, FILM COATED ORAL
COMMUNITY
Start: 2021-04-06 | End: 2022-05-03

## 2021-04-12 NOTE — PROGRESS NOTES
Maximilian Zheng presents today for   Chief Complaint   Patient presents with    Follow-up     daughter requested appt       Maximilian Zheng preferred language for health care discussion is english/other. Is someone accompanying this pt? no    Is the patient using any DME equipment during [address]? no    Depression Screening:  3 most recent PHQ Screens 4/12/2021   Little interest or pleasure in doing things Not at all   Feeling down, depressed, irritable, or hopeless Not at all   Total Score PHQ 2 0       Learning Assessment:  Learning Assessment 7/12/2016   PRIMARY LEARNER Patient   HIGHEST LEVEL OF EDUCATION - PRIMARY LEARNER  -   BARRIERS PRIMARY LEARNER -   CO-LEARNER CAREGIVER -   PRIMARY LANGUAGE ENGLISH   LEARNER PREFERENCE PRIMARY DEMONSTRATION   ANSWERED BY patient   RELATIONSHIP SELF       Abuse Screening:  Abuse Screening Questionnaire 4/12/2021   Do you ever feel afraid of your partner? N   Are you in a relationship with someone who physically or mentally threatens you? N   Is it safe for you to go home? Y       Fall Risk  Fall Risk Assessment, last 12 mths 4/12/2021   Able to walk? Yes   Fall in past 12 months? 0   Do you feel unsteady? 0   Are you worried about falling 0       Pt currently taking Anticoagulant therapy? no    Coordination of Care:  1. Have you been to the ER, urgent care clinic since your last visit? Hospitalized since your last visit? yes    2. Have you seen or consulted any other health care providers outside of the [address] [place] [address] since your last visit? Include any pap smears or colon screening.  no

## 2021-04-12 NOTE — PATIENT INSTRUCTIONS
Follow up with Dr. Mitzi Fernandez in 1 month Bilateral carotid duplex study If you have not heard from the central scheduler to schedule your testing in 48 hours, please call [phone].

## 2021-04-16 ENCOUNTER — HOSPITAL ENCOUNTER (OUTPATIENT)
Dept: CT IMAGING | Age: 81
Discharge: HOME | End: 2021-04-16
Attending: NEUROLOGICAL SURGERY
Payer: MEDICARE

## 2021-04-16 ENCOUNTER — HOSPITAL ENCOUNTER (OUTPATIENT)
Dept: VASCULAR SURGERY | Age: 81
Discharge: HOME | End: 2021-04-16
Attending: INTERNAL MEDICINE
Payer: MEDICARE

## 2021-04-16 DIAGNOSIS — I62.9 INTRACRANIAL HEMORRHAGE (HCC): ICD-10-CM

## 2021-04-16 LAB
LEFT CCA DIST DIAS: 0 CM/S
LEFT CCA DIST SYS: 62 CM/S
LEFT CCA MID DIAS: 0 CM/S
LEFT CCA MID SYS: 80.12 CM/S
LEFT CCA PROX DIAS: 0 CM/S
LEFT CCA PROX SYS: 51.4 CM/S
LEFT ECA DIAS: 5.71 CM/S
LEFT ECA SYS: 137.8 CM/S
LEFT ICA DIST DIAS: 0 CM/S
LEFT ICA DIST SYS: 22.6 CM/S
LEFT ICA MID DIAS: 0 CM/S
LEFT ICA MID SYS: 22.7 CM/S
LEFT ICA PROX DIAS: 0 CM/S
LEFT ICA PROX SYS: 45.2 CM/S
LEFT ICA/CCA SYS: 0.73
LEFT SUBCLAVIAN DIAS: 0 CM/S
LEFT SUBCLAVIAN SYS: 84.1 CM/S
LEFT VERTEBRAL DIAS: 13 CM/S
LEFT VERTEBRAL SYS: 61.9 CM/S
RIGHT CCA DIST DIAS: 17.5 CM/S
RIGHT CCA DIST SYS: 71.3 CM/S
RIGHT CCA MID DIAS: 11.98 CM/S
RIGHT CCA MID SYS: 74.63 CM/S
RIGHT CCA PROX DIAS: 8.1 CM/S
RIGHT CCA PROX SYS: 80.4 CM/S
RIGHT ECA DIAS: 4.42 CM/S
RIGHT ECA SYS: 143.9 CM/S
RIGHT ICA DIST DIAS: 20.8 CM/S
RIGHT ICA DIST SYS: 74.1 CM/S
RIGHT ICA MID DIAS: 19.2 CM/S
RIGHT ICA MID SYS: 129 CM/S
RIGHT ICA PROX DIAS: 39.3 CM/S
RIGHT ICA PROX SYS: 150.8 CM/S
RIGHT ICA/CCA SYS: 2.1
RIGHT SUBCLAVIAN DIAS: 0 CM/S
RIGHT SUBCLAVIAN SYS: 59.2 CM/S
RIGHT VERTEBRAL DIAS: 13.08 CM/S
RIGHT VERTEBRAL SYS: 72.4 CM/S

## 2021-04-16 PROCEDURE — 70450 CT HEAD/BRAIN W/O DYE: CPT

## 2021-04-16 PROCEDURE — 93880 EXTRACRANIAL BILAT STUDY: CPT

## 2021-04-18 NOTE — PROGRESS NOTES
Kiana Coleman is in the office today for cardiovascular evaluation of his underlying ischemic heart disease, carotid disease, and hypercholesterolemia. He has a history of coronary artery disease with a prior inferior wall myocardial infarction  in 1998. He had a cardiac catheterization following an abnormal nuclear myocardial perfusion study  in 2002. The cardiac catheterization demonstratined a 75% proximal LAD lesion, a 35% circumflex obstruction, a 45% OM1 obstruction and a 70% right coronary artery obstruction with an ejection fraction of 70%.       His last nuclear stress test was in August 2018 and it was felt to be a low risk study. He has severe carotid disease and he had a carotid duplex study  completed on July 30, 2019. It demonstrated a totally obstructed left internal carotid artery and a moderate 50 to 69% right internal carotid artery stenosis. The duplex scan was unchanged from a prior study  in August 2018. Had a recent [facility] evaluation and subsequent transfer to [facility]. He had been having intermittent problems with renal lithiasis since February of this year. While at home, he became unable to walk. Lesle Gulling He crawled to the bathroom. His daughter checked up on him and found that he was in an altered state. He was brought to the [facility] ED by the PRS. Prior to this transfer, the patient had a CT of the head. There was evidence for bleeding on his CT was hence the patient was subsequently transferred [facility] as mentioned. There was suspicion that he had had a fall. His Plavix was discontinued. He was seen by neurosurgery, Dr. Coy Hernández, and there was no surgical intervention necessary. According to his daughter, he seems to have worsening memory issues and some slurring of his speech since that time. He is scheduled for repeat CT of the head and carotid duplex studies at the end of this week.     While he was at [facility] she had a negative CT of the chest.  He also had an echocardiogram done on 3/18/2021. Ejection fraction was 64%. Mild MR and TR. There was grade 1 diastolic dysfunction. Encounter Diagnoses   Name Primary?  Bruit, right carotid with know moderate LICA obstruction and total LICA on Carotid doppler's of 7/30/2019 Yes    Stenosis of left carotid artery        Plan; await results of repeat CT of the head and carotid duplex to be done this week. He has no specific complaints. He denies any chest pain or shortness of breath. Agree with hold on Plavix. We will plan to see him back in 3 to 4 weeks. PCP:  Brown Hunt MD       Past Medical History:   Diagnosis Date    Atherosclerotic heart disease     Blurred vision, left eye     resolution with cataract surgery    CAD (coronary artery disease)     Calculus of kidney     Carotid duplex ([facility] 12/17/2015    Mild < 50% JAMAL stenosis. Chronic LICA occlusion. Probable significant RECA stenosis. Unchanged from study of 2/9/15.  Carotid stenosis     Chronic kidney disease, stage IV (severe) ([address] 12/11/2012    BUN and Creatinine 28/2.58     CVA (cerebral vascular accident) ([address] 2/22/05 & 2/23/05    secondary to totally occluded left internal carotid artery    Diverticulosis     Equivocal nuclear cardiac imaging test 12/29/2015    Low-intermediate risk. Inferobasal artifact vs prior injury. Possible mild inferobasal hypk. EF 53%. Positive EKG on EST. Ex time 8 mins 43 secs.  History of echocardiogram 02/23/2005    EF 50-55%. Basal-mid inferior hypk. Mild MR.    History of heart attack     Holter monitor, normal 05/20/2014    Benign 24-hour Holter study.  Hypercholesteremia     Ischemic heart disease     Myocardial infarction, inferior wall ([address] 1998    S/P cardiac catheterization 09/12/2002    LM patent. pLAD 75%. pCx 35%. OM1 45%. dRCA 70%. EF 50%.     Stroke [facility]) 2/2005    Stroke ([address] 03/16/2021    2nd stroke-Brain bleed(went to [facility], then went to [facility])    Vascular disease     Visceral arterial duplex 2012    No significant renal artery stenosis bilaterally. R kidney 10.4 cm. L kidney 10.1 cm. Bilateral intrinsic/med renal disease. Both renal veins patent.  Vitamin B 12 deficiency     with history of replacement         Past Surgical History:   Procedure Laterality Date    HX APPENDECTOMY      HX BACK SURGERY      L5- When he was in high school- [facility] CATARACT REMOVAL  2009    left    HX CATARACT REMOVAL  2012    right    HX CORONARY STENT PLACEMENT      right coronary artery    HX HEART CATHETERIZATION  2002    HX HERNIA REPAIR      PA SPINE SURGERY PROCEDURE UNLISTED      spinal fusion       Current Outpatient Medications   Medication Sig    mirtazapine (REMERON) 15 mg tablet     cholestyramine (QUESTRAN) 4 gram packet     HYDROcodone-acetaminophen (NORCO) 5-325 mg per tablet     levETIRAcetam (KEPPRA) 500 mg tablet Take 1 Tab by mouth two (2) times a day for 30 days. Indications: seizure prophylaxis after intraparenchymal hemorrhage.  atorvastatin (LIPITOR) 80 mg tablet TAKE 1 TABLET BY MOUTH ONCE DAILY    cyanocobalamin (VITAMIN B-12) 1,000 mcg tablet Take 1,000 mcg by mouth daily.  Cholecalciferol, Vitamin D3, 5,000 unit Tab Take 1 Tab by mouth daily.  co-enzyme Q-10 (CO Q-10) 100 mg capsule Take 100 mg by mouth daily.  ascorbic acid (VITAMIN C) 500 mg tablet Take 500 mg by mouth daily. No current facility-administered medications for this visit. No Known Allergies    Social   Social History     Tobacco Use    Smoking status: Never Smoker    Smokeless tobacco: Never Used   Substance Use Topics    Alcohol use: No         Family history: Father  of myocardial infarction at age 66, otherwise no family history of heart disease. Review of Systems:   Constitutional: Negative. Respiratory: Negative. Cardiovascular: Negative. Gastrointestinal: Negative. Musculoskeletal: Negative. Physical Exam:   The patient is an alert, oriented, well developed, well nourished 79 y.o.  male who was in no acute distress at the time of my examination. Visit Vitals  BP (!) 102/54 (BP 1 Location: Right upper arm, BP Patient Position: Sitting, BP Cuff Size: Adult)   Pulse 70   Ht 6' 1\" (1.854 m)   Wt 146 lb (66.2 kg)   SpO2 99%   BMI 19.26 kg/m²      BP Readings from Last 3 Encounters:   04/12/21 (!) 102/54   03/19/21 (!) 112/53   03/16/21 (!) 133/59        Wt Readings from Last 3 Encounters:   04/12/21 146 lb (66.2 kg)   04/02/21 146 lb 4 oz (66.3 kg)   03/18/21 147 lb 11.3 oz (67 kg)       HEENT:  Conjuctiva white, mucosa moist, no pallor or cyanosis. Neck: Supple without masses, tenderness or thyromegaly. No jugular venous distention. Carotid upstroke is decreased on the left without bruit on the left with a moderate pitched bruit over the right carotid. Cardiovascular: Chest is symmetrical with good excursion. [place] is not displaced. No lifts, heaves or thrills. S1 and S2 are normal, without appreciable murmurs, rubs, clicks or gallops. Lungs: Clear to auscultation in all fields. Abdomen: Soft. No masses, tenderness or organomegaly. Extremities: No edema, with full peripheral pulses. Review of Data:  Please refer to past medical history for most recent cardiac testing. Results for orders placed or performed in visit on 03/17/2021   AMB POC EKG ROUTINE W/ 12 LEADS, INTER & REP     Status: None    Narrative    Sinus rhythm with first-degree AV block. PACs. Cannot exclude septal wall myocardial infarction. No change compared to prior tracing. Cinda Fairchild MD , F.A.C.C. Cardiovascular Specialists  [facility] [place]  [address]    PLEASE NOTE:  This document has been produced using voice recognition software.  Unrecognized errors in transcription may be present.

## 2021-05-03 DIAGNOSIS — R09.89 BRUIT: ICD-10-CM

## 2021-05-03 DIAGNOSIS — I65.22 STENOSIS OF LEFT CAROTID ARTERY: ICD-10-CM

## 2021-05-17 ENCOUNTER — OFFICE VISIT (OUTPATIENT)
Dept: CARDIOLOGY CLINIC | Age: 81
End: 2021-05-17
Payer: MEDICARE

## 2021-05-17 VITALS
SYSTOLIC BLOOD PRESSURE: 136 MMHG | OXYGEN SATURATION: 98 % | HEART RATE: 63 BPM | WEIGHT: 146 LBS | HEIGHT: 73 IN | BODY MASS INDEX: 19.35 KG/M2 | DIASTOLIC BLOOD PRESSURE: 64 MMHG

## 2021-05-17 DIAGNOSIS — I25.10 ATHEROSCLEROSIS OF NATIVE CORONARY ARTERY OF NATIVE HEART WITHOUT ANGINA PECTORIS: Primary | ICD-10-CM

## 2021-05-17 PROCEDURE — G8420 CALC BMI NORM PARAMETERS: HCPCS | Performed by: INTERNAL MEDICINE

## 2021-05-17 PROCEDURE — 99214 OFFICE O/P EST MOD 30 MIN: CPT | Performed by: INTERNAL MEDICINE

## 2021-05-17 PROCEDURE — G8536 NO DOC ELDER MAL SCRN: HCPCS | Performed by: INTERNAL MEDICINE

## 2021-05-17 PROCEDURE — 1101F PT FALLS ASSESS-DOCD LE1/YR: CPT | Performed by: INTERNAL MEDICINE

## 2021-05-17 PROCEDURE — G8427 DOCREV CUR MEDS BY ELIG CLIN: HCPCS | Performed by: INTERNAL MEDICINE

## 2021-05-17 PROCEDURE — G8510 SCR DEP NEG, NO PLAN REQD: HCPCS | Performed by: INTERNAL MEDICINE

## 2021-05-17 RX ORDER — TEMAZEPAM 7.5 MG/1
7.5 CAPSULE ORAL
COMMUNITY
End: 2022-05-03

## 2021-05-17 RX ORDER — MEMANTINE HYDROCHLORIDE 5 MG/1
10 TABLET ORAL 2 TIMES DAILY
COMMUNITY
End: 2022-05-03

## 2021-05-17 NOTE — PROGRESS NOTES
Maricruz Fontaine presents today for   Chief Complaint   Patient presents with    Follow-up     1 month follow up        Maricruz Fontaine preferred language for health care discussion is english/other. Is someone accompanying this pt? yes    Is the patient using any DME equipment during OV? cane  Depression Screening:  3 most recent PHQ Screens 5/17/2021   Little interest or pleasure in doing things Not at all   Feeling down, depressed, irritable, or hopeless Not at all   Total Score PHQ 2 0       Learning Assessment:  Learning Assessment 7/12/2016   PRIMARY LEARNER Patient   HIGHEST LEVEL OF EDUCATION - PRIMARY LEARNER  -   BARRIERS PRIMARY LEARNER -   CO-LEARNER CAREGIVER -   PRIMARY LANGUAGE ENGLISH   LEARNER PREFERENCE PRIMARY DEMONSTRATION   ANSWERED BY patient   RELATIONSHIP SELF       Abuse Screening:  Abuse Screening Questionnaire 5/17/2021   Do you ever feel afraid of your partner? N   Are you in a relationship with someone who physically or mentally threatens you? N   Is it safe for you to go home? Y       Fall Risk  Fall Risk Assessment, last 12 mths 5/17/2021   Able to walk? Yes   Fall in past 12 months? 0   Do you feel unsteady? 0   Are you worried about falling 0       Pt currently taking Anticoagulant therapy? no    Coordination of Care:  1. Have you been to the ER, urgent care clinic since your last visit? Hospitalized since your last visit? no    2. Have you seen or consulted any other health care providers outside of the [address] [place] [address] since your last visit? Include any pap smears or colon screening.  no

## 2021-05-24 NOTE — PROGRESS NOTES
Isabel Hernández is in the office today for cardiovascular evaluation of his underlying ischemic heart disease, carotid disease, and hypercholesterolemia. He has a history of coronary artery disease with a prior inferior wall myocardial infarction  in 1998. He had a cardiac catheterization following an abnormal nuclear myocardial perfusion study  in 2002. The cardiac catheterization demonstratined a 75% proximal LAD lesion, a 35% circumflex obstruction, a 45% OM1 obstruction and a 70% right coronary artery obstruction with an ejection fraction of 70%.       His last nuclear stress test was in August 2018 and it was felt to be a low risk study. He has severe carotid disease and he had a carotid duplex study  completed on July 30, 2019. It demonstrated a totally obstructed left internal carotid artery and a moderate 50 to 69% right internal carotid artery stenosis. The duplex scan was unchanged from a prior study  in August 2018. The carotid study was repeated on 4/16/2021. There was moderate stenosis in the right ICA (50 to 69%) there was mild stenosis in the left ICA (less than 50%). Para 2 oh study on 36/17/21 there was minimal flow detected in the left internal carotid artery. Had a recent [facility] ED evaluation and subsequent transfer to [facility]. He had been having intermittent problems with renal lithiasis since February of this year. While at home, he became unable to walk. Fredirick Sales He crawled to the bathroom. His daughter checked up on him and found that he was in an altered state. He was brought to the [facility] ED by the PRS. Prior to this transfer, the patient had a CT of the head. There was evidence for bleeding on his CT was hence the patient was subsequently transferred [facility] as mentioned. There was suspicion that he had had a fall. His Plavix was discontinued. He was seen by neurosurgery, Dr. Zohaib Sterling, and there was no surgical intervention necessary.   He had a recent follow-up with with Dr. Armida Marks and was released. He was tells me there was no evidence for any bleeding and now is following with neurology only. His daughter reports that he has been diagnosed with \"late onset Alzheimer's\". He is much more alert today. He has been eating well. He has had no chest pain. A has had no increasing shortness of breath. No diagnosis found. Plan;  repeat CT of the head results are as noted above. Vonluis Goodpasture He has no specific complaints. He denies any chest pain or shortness of breath. Agree with continued hold on Plavix. We will plan to see him back in 3 months    PCP:  Kimberly Estrella MD       Past Medical History:   Diagnosis Date    Atherosclerotic heart disease     Blurred vision, left eye     resolution with cataract surgery    CAD (coronary artery disease)     Calculus of kidney     Carotid duplex ([address] 12/17/2015    Mild < 50% JAMAL stenosis. Chronic LICA occlusion. Probable significant RECA stenosis. Unchanged from study of 2/9/15.  Carotid stenosis     Chronic kidney disease, stage IV (severe) ([address] 12/11/2012    BUN and Creatinine 28/2.58     CVA (cerebral vascular accident) ([address] 2/22/05 & 2/23/05    secondary to totally occluded left internal carotid artery    Diverticulosis     Equivocal nuclear cardiac imaging test 12/29/2015    Low-intermediate risk. Inferobasal artifact vs prior injury. Possible mild inferobasal hypk. EF 53%. Positive EKG on EST. Ex time 8 mins 43 secs.  History of echocardiogram 02/23/2005    EF 50-55%. Basal-mid inferior hypk. Mild MR.    History of heart attack     Holter monitor, normal 05/20/2014    Benign 24-hour Holter study.  Hypercholesteremia     Ischemic heart disease     Myocardial infarction, inferior wall ([address] 1998    S/P cardiac catheterization 09/12/2002    LM patent. pLAD 75%. pCx 35%. OM1 45%. dRCA 70%. EF 50%.     Stroke [facility]) 2/2005    Stroke [facility] 03/16/2021    2nd stroke-Brain bleed(went to [facility], then went to [facility])    Vascular disease     Visceral arterial duplex 2012    No significant renal artery stenosis bilaterally. R kidney 10.4 cm. L kidney 10.1 cm. Bilateral intrinsic/med renal disease. Both renal veins patent.  Vitamin B 12 deficiency     with history of replacement         Past Surgical History:   Procedure Laterality Date    HX APPENDECTOMY      HX BACK SURGERY      L5- When he was in high school- [address] CATARACT REMOVAL  2009    left    HX CATARACT REMOVAL  2012    right    HX CORONARY [address] [place] [address]    right coronary artery    HX HEART CATHETERIZATION  2002    HX HERNIA REPAIR      SD SPINE SURGERY PROCEDURE UNLISTED      spinal fusion       Current Outpatient Medications   Medication Sig    memantine (NAMENDA) 5 mg tablet Take 10 mg by mouth two (2) times a day.  temazepam (RESTORIL) 7.5 mg capsule Take 7.5 mg by mouth nightly.  mirtazapine (REMERON) 15 mg tablet     cholestyramine (QUESTRAN) 4 gram packet     HYDROcodone-acetaminophen (NORCO) 5-325 mg per tablet     atorvastatin (LIPITOR) 80 mg tablet TAKE 1 TABLET BY MOUTH ONCE DAILY    cyanocobalamin (VITAMIN B-12) 1,000 mcg tablet Take 1,000 mcg by mouth daily.  Cholecalciferol, Vitamin D3, 5,000 unit Tab Take 1 Tab by mouth daily.  co-enzyme Q-10 (CO Q-10) 100 mg capsule Take 100 mg by mouth daily.  ascorbic acid (VITAMIN C) 500 mg tablet Take 500 mg by mouth daily. No current facility-administered medications for this visit. No Known Allergies    Social   Social History     Tobacco Use    Smoking status: Never Smoker    Smokeless tobacco: Never Used   Substance Use Topics    Alcohol use: No         Family history: Father  of myocardial infarction at age 66, otherwise no family history of heart disease. Review of Systems:   Constitutional: Negative. Respiratory: Negative. Cardiovascular: Negative. Gastrointestinal: Negative. Musculoskeletal: Negative. Physical Exam:   The patient is an alert, oriented, well developed, well nourished 79 y.o.  male who was in no acute distress at the time of my examination. Visit Vitals  /64 (BP 1 Location: Left upper arm, BP Patient Position: Sitting, BP Cuff Size: Adult)   Pulse 63   Ht 6' 1\" (1.854 m)   Wt 66.2 kg (146 lb)   SpO2 98%   BMI 19.26 kg/m²      BP Readings from Last 3 Encounters:   05/17/21 136/64   04/12/21 (!) 102/54   03/19/21 (!) 112/53        Wt Readings from Last 3 Encounters:   05/17/21 66.2 kg (146 lb)   04/12/21 66.2 kg (146 lb)   04/02/21 66.3 kg (146 lb 4 oz)       HEENT:  Conjuctiva white, mucosa moist, no pallor or cyanosis. Neck: Supple without masses, tenderness or thyromegaly. No jugular venous distention. Carotid upstroke is decreased on the left without bruit on the left with a moderate pitched bruit over the right carotid. Cardiovascular: Chest is symmetrical with good excursion. [place] is not displaced. No lifts, heaves or thrills. S1 and S2 are normal, without appreciable murmurs, rubs, clicks or gallops. Lungs: Clear to auscultation in all fields. Abdomen: Soft. No masses, tenderness or organomegaly. Extremities: No edema, with full peripheral pulses. Review of Data:  Please refer to past medical history for most recent cardiac testing. Results for orders placed or performed in visit on 03/17/2021   AMB POC EKG ROUTINE W/ 12 LEADS, INTER & REP     Status: None    Narrative    Sinus rhythm with first-degree AV block. PACs. Cannot exclude septal wall myocardial infarction. No change compared to prior tracing. Viet Burton MD , F.A.C.C. Cardiovascular Specialists  [facility] [place]  [address]. [address]    PLEASE NOTE:  This document has been produced using voice recognition software.  Unrecognized errors in transcription may be present.

## 2021-08-23 ENCOUNTER — OFFICE VISIT (OUTPATIENT)
Dept: CARDIOLOGY CLINIC | Age: 81
End: 2021-08-23
Payer: MEDICARE

## 2021-08-23 VITALS
OXYGEN SATURATION: 98 % | SYSTOLIC BLOOD PRESSURE: 102 MMHG | WEIGHT: 141 LBS | DIASTOLIC BLOOD PRESSURE: 58 MMHG | HEART RATE: 66 BPM | BODY MASS INDEX: 18.69 KG/M2 | HEIGHT: 73 IN

## 2021-08-23 DIAGNOSIS — I25.10 ATHEROSCLEROSIS OF NATIVE CORONARY ARTERY OF NATIVE HEART WITHOUT ANGINA PECTORIS: Primary | ICD-10-CM

## 2021-08-23 PROCEDURE — G8420 CALC BMI NORM PARAMETERS: HCPCS | Performed by: INTERNAL MEDICINE

## 2021-08-23 PROCEDURE — G8510 SCR DEP NEG, NO PLAN REQD: HCPCS | Performed by: INTERNAL MEDICINE

## 2021-08-23 PROCEDURE — 1101F PT FALLS ASSESS-DOCD LE1/YR: CPT | Performed by: INTERNAL MEDICINE

## 2021-08-23 PROCEDURE — 99214 OFFICE O/P EST MOD 30 MIN: CPT | Performed by: INTERNAL MEDICINE

## 2021-08-23 PROCEDURE — G8536 NO DOC ELDER MAL SCRN: HCPCS | Performed by: INTERNAL MEDICINE

## 2021-08-23 PROCEDURE — G8427 DOCREV CUR MEDS BY ELIG CLIN: HCPCS | Performed by: INTERNAL MEDICINE

## 2021-08-23 RX ORDER — CHOLECALCIFEROL (VITAMIN D3) 125 MCG
CAPSULE ORAL
COMMUNITY
End: 2022-05-03

## 2021-08-23 RX ORDER — BISMUTH SUBSALICYLATE 262 MG
1 TABLET,CHEWABLE ORAL DAILY
COMMUNITY
End: 2022-05-03

## 2021-08-23 NOTE — PROGRESS NOTES
Daiana Barrow presents today for   Chief Complaint   Patient presents with    Follow-up     3 month f/u       Daiana Barrow preferred language for health care discussion is english/other. Is someone accompanying this pt? yes    Is the patient using any DME equipment during [address]? walker    Depression Screening:  3 most recent PHQ Screens 8/23/2021   Little interest or pleasure in doing things Not at all   Feeling down, depressed, irritable, or hopeless Not at all   Total Score PHQ 2 0       Learning Assessment:  Learning Assessment 7/12/2016   PRIMARY LEARNER Patient   HIGHEST LEVEL OF EDUCATION - PRIMARY LEARNER  -   BARRIERS PRIMARY LEARNER -   CO-LEARNER CAREGIVER -   PRIMARY LANGUAGE ENGLISH   LEARNER PREFERENCE PRIMARY DEMONSTRATION   ANSWERED BY patient   RELATIONSHIP SELF       Abuse Screening:  Abuse Screening Questionnaire 8/23/2021   Do you ever feel afraid of your partner? N   Are you in a relationship with someone who physically or mentally threatens you? N   Is it safe for you to go home? Y       Fall Risk  Fall Risk Assessment, last 12 mths 8/23/2021   Able to walk? Yes   Fall in past 12 months? 1   Do you feel unsteady? 1   Are you worried about falling 1   Is the gait abnormal? 0   Fall with injury? 0       Pt currently taking Anticoagulant therapy? no    Coordination of Care:  1. Have you been to the ER, urgent care clinic since your last visit? Hospitalized since your last visit? no    2. Have you seen or consulted any other health care providers outside of the [address] [place] [address] since your last visit? Include any pap smears or colon screening.  no

## 2021-08-30 NOTE — PROGRESS NOTES
Xavier Olsen is in the office today for cardiovascular evaluation of his underlying ischemic heart disease, carotid disease, and hypercholesterolemia. He has a history of coronary artery disease with a prior inferior wall myocardial infarction  in 1998. He had a cardiac catheterization following an abnormal nuclear myocardial perfusion study  in 2002. The cardiac catheterization demonstratined a 75% proximal LAD lesion, a 35% circumflex obstruction, a 45% OM1 obstruction and a 70% right coronary artery obstruction with an ejection fraction of 70%.       His last nuclear stress test was in August 2018 and it was felt to be a low risk study. He has severe carotid disease and he had a carotid duplex study  completed on July 30, 2019. It demonstrated a totally obstructed left internal carotid artery and a moderate 50 to 69% right internal carotid artery stenosis. The duplex scan was unchanged from a prior study  in August 2018. The carotid study was repeated on 4/16/2021. There was moderate stenosis in the right ICA (50 to 69%) there was mild stenosis in the left ICA (less than 50%). Compared to the prior study, there was some minimal flow detected in the left internal carotid artery. He had a  [facility] ED evaluation and subsequent transfer to [facility]. He had been having intermittent problems with renal lithiasis since February of this year. While at home, he became unable to walk. Sueellen Payment He crawled to the bathroom. His daughter checked up on him and found that he was in an altered state. He was brought to the [facility] ED by the PRS. Prior to the transfer and the reason for the transfer was an abnormal  CT of the head. There was evidence for a bleed on his head CT. There was suspicion that he had had a fall. His Plavix was discontinued. He was seen by neurosurgery, Dr. Argenis Chris, and there was no surgical intervention necessary.   He had a subsequent follow-up with with Dr. Argenis Chris and he was released. He continues to follow  with neurology. His daughter reported that he was diagnosed with \"late onset Alzheimer's\". His daughter accompanies him to the appointment today. The patient reports he feels \"fine \". His daughter says it when he goes to physical therapy, he seems to be a little disoriented and less steady on his feet afterwards. Has had 1 fall which she described as being \"graceful \". He did not injure himself. He denies dizziness. He has had no chest pain or shortness of breath. He has lost 5 pounds since his last appointment. He was taking Aricept which may have been causing  diarrhea. He has been better since stopping that medicine. No diagnosis found. Plan; in the office today, he has no specific complaints. He denies any chest pain or shortness of breath. Will continue hold  on Plavix. We will plan to see him back in 3 months    PCP:  Melanie Staley MD       Past Medical History:   Diagnosis Date    Atherosclerotic heart disease     Blurred vision, left eye     resolution with cataract surgery    CAD (coronary artery disease)     Calculus of kidney     Carotid duplex ([address] 12/17/2015    Mild < 50% JAMAL stenosis. Chronic LICA occlusion. Probable significant RECA stenosis. Unchanged from study of 2/9/15.  Carotid stenosis     Chronic kidney disease, stage IV (severe) ([address] 12/11/2012    BUN and Creatinine 28/2.58     CVA (cerebral vascular accident) ([address] 2/22/05 & 2/23/05    secondary to totally occluded left internal carotid artery    Diverticulosis     Equivocal nuclear cardiac imaging test 12/29/2015    Low-intermediate risk. Inferobasal artifact vs prior injury. Possible mild inferobasal hypk. EF 53%. Positive EKG on EST. Ex time 8 mins 43 secs.  History of echocardiogram 02/23/2005    EF 50-55%. Basal-mid inferior hypk. Mild MR.    History of heart attack     Holter monitor, normal 05/20/2014    Benign 24-hour Holter study.  Hypercholesteremia     Ischemic heart disease     Myocardial infarction, inferior wall ([facility]    S/P cardiac catheterization 09/12/2002    LM patent. pLAD 75%. pCx 35%. OM1 45%. dRCA 70%. EF 50%.  Stroke [facility]) 2/2005    Stroke ([facility] 03/16/2021    2nd stroke-Brain bleed(went to [facility], then went to [facility])    Vascular disease     Visceral arterial duplex 12/21/2012    No significant renal artery stenosis bilaterally. R kidney 10.4 cm. L kidney 10.1 cm. Bilateral intrinsic/med renal disease. Both renal veins patent.  Vitamin B 12 deficiency     with history of replacement         Past Surgical History:   Procedure Laterality Date    HX APPENDECTOMY      HX BACK SURGERY      L5- When he was in high school- [facility] CATARACT REMOVAL  9/2009    left    HX CATARACT REMOVAL  1/4/2012    right    HX CORONARY STENT PLACEMENT  1998    right coronary artery    HX HEART CATHETERIZATION  9/2002    HX HERNIA REPAIR      CO SPINE SURGERY PROCEDURE UNLISTED      spinal fusion       Current Outpatient Medications   Medication Sig    Lactobacillus acidophilus (PROBIOTIC PO) Take  by mouth.  ZINC PO Take  by mouth.  multivitamin (ONE A DAY) tablet Take 1 Tablet by mouth daily.  melatonin 5 mg tablet Take  by mouth nightly.  memantine (NAMENDA) 5 mg tablet Take 10 mg by mouth two (2) times a day.  temazepam (RESTORIL) 7.5 mg capsule Take 7.5 mg by mouth nightly.  mirtazapine (REMERON) 15 mg tablet     cholestyramine (QUESTRAN) 4 gram packet     HYDROcodone-acetaminophen (NORCO) 5-325 mg per tablet     atorvastatin (LIPITOR) 80 mg tablet TAKE 1 TABLET BY MOUTH ONCE DAILY    cyanocobalamin (VITAMIN B-12) 1,000 mcg tablet Take 1,000 mcg by mouth daily.  Cholecalciferol, Vitamin D3, 5,000 unit Tab Take 1 Tab by mouth daily.  co-enzyme Q-10 (CO Q-10) 100 mg capsule Take 100 mg by mouth daily.       ascorbic acid (VITAMIN C) 500 mg tablet Take 500 mg by mouth daily. No current facility-administered medications for this visit. No Known Allergies    Social   Social History     Tobacco Use    Smoking status: Never Smoker    Smokeless tobacco: Never Used   Substance Use Topics    Alcohol use: No         Family history: Father  of myocardial infarction at age 66, otherwise no family history of heart disease. Review of Systems:   Constitutional: Negative. Respiratory: Negative. Cardiovascular: Negative. Gastrointestinal: Negative. Musculoskeletal: Negative. Physical Exam:   The patient is an alert, oriented, well developed, well nourished 79 y.o.  male who was in no acute distress at the time of my examination. Visit Vitals  BP (!) 102/58 (BP 1 Location: Left upper arm, BP Patient Position: Sitting, BP Cuff Size: Adult)   Pulse 66   Ht 6' 1\" (1.854 m)   Wt 64 kg (141 lb)   SpO2 98%   BMI 18.60 kg/m²      BP Readings from Last 3 Encounters:   21 (!) 102/58   21 136/64   21 (!) 102/54        Wt Readings from Last 3 Encounters:   21 64 kg (141 lb)   21 66.2 kg (146 lb)   21 66.2 kg (146 lb)       HEENT:  Conjuctiva white, mucosa moist, no pallor or cyanosis. Neck: Supple without masses, tenderness or thyromegaly. No jugular venous distention. Right carotid has a bruit. Cardiovascular: Chest is symmetrical with good excursion. S1 and S2 are normal, without appreciable murmurs, rubs, clicks or gallops. Lungs: Clear to auscultation in all fields. Abdomen: Soft. No masses, tenderness or organomegaly. Extremities: No edema, with full peripheral pulses. Review of Data:  Please refer to past medical history for most recent cardiac testing. Results for orders placed or performed in visit on 2021   AMB POC EKG ROUTINE W/ 12 LEADS, INTER & REP     Status: None    Narrative    Sinus rhythm with first-degree AV block. PACs.   Cannot exclude septal wall myocardial infarction. No change compared to prior tracing. Lexi Mccoy MD , F.A.C.C. Cardiovascular Specialists  [facility] [place]  [address]. [address]    PLEASE NOTE:  This document has been produced using voice recognition software. Unrecognized errors in transcription may be present.

## 2021-11-23 ENCOUNTER — OFFICE VISIT (OUTPATIENT)
Dept: CARDIOLOGY CLINIC | Age: 81
End: 2021-11-23
Payer: MEDICARE

## 2021-11-23 VITALS
OXYGEN SATURATION: 100 % | HEIGHT: 73 IN | BODY MASS INDEX: 18.82 KG/M2 | WEIGHT: 142 LBS | HEART RATE: 67 BPM | DIASTOLIC BLOOD PRESSURE: 88 MMHG | SYSTOLIC BLOOD PRESSURE: 136 MMHG

## 2021-11-23 DIAGNOSIS — I25.10 ATHEROSCLEROSIS OF NATIVE CORONARY ARTERY OF NATIVE HEART WITHOUT ANGINA PECTORIS: Primary | ICD-10-CM

## 2021-11-23 PROCEDURE — G8510 SCR DEP NEG, NO PLAN REQD: HCPCS | Performed by: INTERNAL MEDICINE

## 2021-11-23 PROCEDURE — 99214 OFFICE O/P EST MOD 30 MIN: CPT | Performed by: INTERNAL MEDICINE

## 2021-11-23 PROCEDURE — 1101F PT FALLS ASSESS-DOCD LE1/YR: CPT | Performed by: INTERNAL MEDICINE

## 2021-11-23 PROCEDURE — G8427 DOCREV CUR MEDS BY ELIG CLIN: HCPCS | Performed by: INTERNAL MEDICINE

## 2021-11-23 PROCEDURE — G8536 NO DOC ELDER MAL SCRN: HCPCS | Performed by: INTERNAL MEDICINE

## 2021-11-23 PROCEDURE — G8420 CALC BMI NORM PARAMETERS: HCPCS | Performed by: INTERNAL MEDICINE

## 2021-11-28 NOTE — PROGRESS NOTES
Vidal Mclain is in the office today for cardiovascular evaluation of his underlying ischemic heart disease, carotid disease, and hypercholesterolemia. He has a history of coronary artery disease with a prior inferior wall myocardial infarction  in 1998. He had a cardiac catheterization following an abnormal nuclear myocardial perfusion study  in 2002. The cardiac catheterization demonstratined a 75% proximal LAD lesion, a 35% circumflex obstruction, a 45% OM1 obstruction and a 70% right coronary artery obstruction with an ejection fraction of 70%. He was treated medically.         His last nuclear stress test was in August 2018 and it was felt to be a low risk study. He has severe carotid disease and he had a carotid duplex study  completed on July 30, 2019. It demonstrated a totally obstructed left internal carotid artery and a moderate 50 to 69% right internal carotid artery stenosis. The duplex scan was unchanged from a prior study  in August 2018. The carotid study was repeated on 4/16/2021. There was moderate stenosis in the right ICA (50 to 69%) there was mild stenosis in the left ICA (less than 50%). Compared to the prior study, there was some minimal flow detected in the left internal carotid artery. He had a  [facility] ED evaluation and subsequent transfer to [facility]. He has also been having intermittent problems with renal lithiasis since February of this year. While at home, he became unable to walk. Nate Canchola He crawled to the bathroom. His daughter checked up on him and found that he was in an altered state. He was brought to the [facility] ED by the PRS. Prior to the transfer and the reason for the transfer was an abnormal  CT of the head. There was evidence for a bleed on his head CT. There was suspicion that he had had a fall. His Plavix was discontinued. He was seen by neurosurgery, Dr. Lawyer Christy, and there was no surgical intervention necessary.   He had a subsequent follow-up with with Dr. Gulshan Russo and he was released. He continues to follow  with neurology. His daughter reported that he was diagnosed with \"late onset Alzheimer's\". His daughter accompanies him to the appointment again today. The patient actually lives with his son. The patient reports he feels \"fine \". He was losing weight but his weight appears to have stabilized at present. He denies pain. He has had no shortness of breath. He remains very sedentary. No diagnosis found. Plan; in the office today, the patient has no specific complaints. He denies any chest pain or shortness of breath. Will continue hold  on Plavix. We will plan to see him back in 6 months. Will arrange for carotid studies to be repeated at that time. PCP:  Yoseph Roland MD       Past Medical History:   Diagnosis Date    Atherosclerotic heart disease     Blurred vision, left eye     resolution with cataract surgery    CAD (coronary artery disease)     Calculus of kidney     Carotid duplex [facility] 12/17/2015    Mild < 50% JAMAL stenosis. Chronic LICA occlusion. Probable significant RECA stenosis. Unchanged from study of 2/9/15.  Carotid stenosis     Chronic kidney disease, stage IV (severe) ([address] 12/11/2012    BUN and Creatinine 28/2.58     CVA (cerebral vascular accident) ([address] 2/22/05 & 2/23/05    secondary to totally occluded left internal carotid artery    Diverticulosis     Equivocal nuclear cardiac imaging test 12/29/2015    Low-intermediate risk. Inferobasal artifact vs prior injury. Possible mild inferobasal hypk. EF 53%. Positive EKG on EST. Ex time 8 mins 43 secs.  History of echocardiogram 02/23/2005    EF 50-55%. Basal-mid inferior hypk. Mild MR.    History of heart attack     Holter monitor, normal 05/20/2014    Benign 24-hour Holter study.     Hypercholesteremia     Ischemic heart disease     Myocardial infarction, inferior wall ([address] 1998    S/P cardiac catheterization 09/12/2002    LM patent. pLAD 75%. pCx 35%. OM1 45%. dRCA 70%. EF 50%.  Stroke [facility]) 2/2005    Stroke ([facility] 03/16/2021    2nd stroke-Brain bleed(went to [facility], then went to [facility])    Vascular disease     Visceral arterial duplex 12/21/2012    No significant renal artery stenosis bilaterally. R kidney 10.4 cm. L kidney 10.1 cm. Bilateral intrinsic/med renal disease. Both renal veins patent.  Vitamin B 12 deficiency     with history of replacement         Past Surgical History:   Procedure Laterality Date    HX APPENDECTOMY      HX BACK SURGERY      L5- When he was in high school- [facility] CATARACT REMOVAL  9/2009    left    HX CATARACT REMOVAL  1/4/2012    right    HX CORONARY STENT PLACEMENT  1998    right coronary artery    HX HEART CATHETERIZATION  9/2002    HX HERNIA REPAIR      PA SPINE SURGERY PROCEDURE UNLISTED      spinal fusion       Current Outpatient Medications   Medication Sig    Lactobacillus acidophilus (PROBIOTIC PO) Take  by mouth.  ZINC PO Take  by mouth.  multivitamin (ONE A DAY) tablet Take 1 Tablet by mouth daily.  melatonin 5 mg tablet Take  by mouth nightly.  memantine (NAMENDA) 5 mg tablet Take 10 mg by mouth two (2) times a day.  temazepam (RESTORIL) 7.5 mg capsule Take 7.5 mg by mouth nightly.  mirtazapine (REMERON) 15 mg tablet     cholestyramine (QUESTRAN) 4 gram packet     HYDROcodone-acetaminophen (NORCO) 5-325 mg per tablet     atorvastatin (LIPITOR) 80 mg tablet TAKE 1 TABLET BY MOUTH ONCE DAILY    cyanocobalamin (VITAMIN B-12) 1,000 mcg tablet Take 1,000 mcg by mouth daily.  Cholecalciferol, Vitamin D3, 5,000 unit Tab Take 1 Tab by mouth daily.  co-enzyme Q-10 (CO Q-10) 100 mg capsule Take 100 mg by mouth daily.  ascorbic acid (VITAMIN C) 500 mg tablet Take 500 mg by mouth daily. No current facility-administered medications for this visit.         No Known Allergies    Social   Social History     Tobacco Use    Smoking status: Never Smoker    Smokeless tobacco: Never Used   Substance Use Topics    Alcohol use: No         Family history: Father  of myocardial infarction at age 66, otherwise no family history of heart disease. Review of Systems:   Constitutional: Negative. Respiratory: Negative. Cardiovascular: Negative. Gastrointestinal: Negative. Musculoskeletal: Negative. Physical Exam:   The patient is an alert and oriented  Visit Vitals  /88 (BP 1 Location: Left upper arm, BP Patient Position: Sitting, BP Cuff Size: Adult)   Pulse 67   Ht 6' 1\" (1.854 m)   Wt 64.4 kg (142 lb)   SpO2 100%   BMI 18.73 kg/m²      BP Readings from Last 3 Encounters:   21 136/88   21 (!) 102/58   21 136/64        Wt Readings from Last 3 Encounters:   21 64.4 kg (142 lb)   21 64 kg (141 lb)   21 66.2 kg (146 lb)       HEENT:  Conjuctiva white and mucosa moist  Neck: Supple without masses, tenderness or thyromegaly. No jugular venous distention. Right carotid has a bruit. Cardiovascular: Chest is symmetrical with good excursion. S1 and S2 are normal.  No, rubs, clicks or gallops. Lungs: Clear to auscultation in all fields. Abdomen: Soft. No masses, tenderness or organomegaly. Extremities: No edema, with full peripheral pulses. Review of Data:  Please refer to past medical history for most recent cardiac testing. Results for orders placed or performed in visit on 2021   AMB POC EKG ROUTINE W/ 12 LEADS, INTER & REP     Status: None    Narrative    Sinus rhythm with first-degree AV block. PACs. Cannot exclude septal wall myocardial infarction. No change compared to prior tracing. Wilda Avila MD , F.A.C.C. Cardiovascular Specialists  [facility] [place]  [address].   [address]    PLEASE NOTE:  This document has been produced using voice recognition software. Unrecognized errors in transcription may be present.

## 2022-02-10 RX ORDER — ATORVASTATIN CALCIUM 80 MG/1
TABLET, FILM COATED ORAL
Qty: 90 TABLET | Refills: 2 | Status: SHIPPED | OUTPATIENT
Start: 2022-02-10 | End: 2022-11-01 | Stop reason: SDUPTHER

## 2022-05-03 ENCOUNTER — OFFICE VISIT (OUTPATIENT)
Dept: CARDIOLOGY CLINIC | Age: 82
End: 2022-05-03
Payer: MEDICARE

## 2022-05-03 VITALS
HEART RATE: 59 BPM | SYSTOLIC BLOOD PRESSURE: 104 MMHG | OXYGEN SATURATION: 99 % | WEIGHT: 140 LBS | DIASTOLIC BLOOD PRESSURE: 70 MMHG | HEIGHT: 73 IN | BODY MASS INDEX: 18.55 KG/M2

## 2022-05-03 DIAGNOSIS — I65.22 STENOSIS OF LEFT CAROTID ARTERY: ICD-10-CM

## 2022-05-03 DIAGNOSIS — E78.5 DYSLIPIDEMIA: ICD-10-CM

## 2022-05-03 DIAGNOSIS — R00.1 SINUS BRADYCARDIA, PERSISTENT: ICD-10-CM

## 2022-05-03 DIAGNOSIS — I25.10 ATHEROSCLEROSIS OF NATIVE CORONARY ARTERY OF NATIVE HEART WITHOUT ANGINA PECTORIS: Primary | ICD-10-CM

## 2022-05-03 PROCEDURE — 93000 ELECTROCARDIOGRAM COMPLETE: CPT | Performed by: INTERNAL MEDICINE

## 2022-05-03 PROCEDURE — G8419 CALC BMI OUT NRM PARAM NOF/U: HCPCS | Performed by: INTERNAL MEDICINE

## 2022-05-03 PROCEDURE — 99214 OFFICE O/P EST MOD 30 MIN: CPT | Performed by: INTERNAL MEDICINE

## 2022-05-03 PROCEDURE — 1101F PT FALLS ASSESS-DOCD LE1/YR: CPT | Performed by: INTERNAL MEDICINE

## 2022-05-03 PROCEDURE — G8427 DOCREV CUR MEDS BY ELIG CLIN: HCPCS | Performed by: INTERNAL MEDICINE

## 2022-05-03 PROCEDURE — G8510 SCR DEP NEG, NO PLAN REQD: HCPCS | Performed by: INTERNAL MEDICINE

## 2022-05-03 PROCEDURE — G8536 NO DOC ELDER MAL SCRN: HCPCS | Performed by: INTERNAL MEDICINE

## 2022-05-03 RX ORDER — MEMANTINE HYDROCHLORIDE 10 MG/1
10 TABLET ORAL 2 TIMES DAILY
COMMUNITY
Start: 2022-03-10

## 2022-05-03 RX ORDER — MIRTAZAPINE 30 MG/1
30 TABLET, FILM COATED ORAL
COMMUNITY
Start: 2022-02-26

## 2022-05-03 NOTE — PROGRESS NOTES
Yamilex Bakari presents today for   Chief Complaint   Patient presents with    Follow-up     6 months        Yamilex Villegas preferred language for health care discussion is english/other. Is someone accompanying this pt? Daughter     Is the patient using any DME equipment during [address]? Walker     Depression Screening:  3 most recent PHQ Screens 5/3/2022   Little interest or pleasure in doing things Not at all   Feeling down, depressed, irritable, or hopeless Not at all   Total Score PHQ 2 0       Learning Assessment:  Learning Assessment 7/12/2016   PRIMARY LEARNER Patient   HIGHEST LEVEL OF EDUCATION - PRIMARY LEARNER  -   BARRIERS PRIMARY LEARNER -   CO-LEARNER CAREGIVER -   PRIMARY LANGUAGE ENGLISH   LEARNER PREFERENCE PRIMARY DEMONSTRATION   ANSWERED BY patient   RELATIONSHIP SELF       Abuse Screening:  Abuse Screening Questionnaire 8/23/2021   Do you ever feel afraid of your partner? N   Are you in a relationship with someone who physically or mentally threatens you? N   Is it safe for you to go home? Y       Fall Risk  Fall Risk Assessment, last 12 mths 5/3/2022   Able to walk? Yes   Fall in past 12 months? 0   Do you feel unsteady? 0   Are you worried about falling 0   Is the gait abnormal? -   Fall with injury? -       Pt currently taking Anticoagulant therapy? no    Coordination of Care:  1. Have you been to the ER, urgent care clinic since your last visit? Hospitalized since your last visit? no    2. Have you seen or consulted any other health care providers outside of the [address] [place] [address] since your last visit? Include any pap smears or colon screening.  No

## 2022-05-09 NOTE — PROGRESS NOTES
Willian Ferreira is in the office today for cardiovascular evaluation of his  ischemic heart disease, carotid disease, and hypercholesterolemia. He has a history of coronary artery disease with a prior inferior wall myocardial infarction  in 1998. He had a cardiac catheterization following an abnormal nuclear myocardial perfusion study  in 2002. The cardiac catheterization demonstratined a 75% proximal LAD lesion, a 35% circumflex obstruction, a 45% OM1 obstruction and a 70% right coronary artery obstruction with an ejection fraction of 70%. He was treated medically.         His last nuclear stress test was in August 2018 and it was felt to be a low risk study. He has severe carotid disease and he had a carotid duplex study  completed on July 30, 2019. It demonstrated a totally obstructed left internal carotid artery and a moderate 50 to 69% right internal carotid artery stenosis. The duplex scan was unchanged from a prior study  in August 2018. The carotid study was repeated on 4/16/2021. There was moderate stenosis in the right ICA (50 to 69%) there was mild stenosis in the left ICA (less than 50%). Compared to the prior study, there was minimal flow detected in the left internal carotid artery. He had a  [facility] ED evaluation and subsequent transfer to [facility]. Prior to the evaluation, he became unable to walk. Ulices Brittle He crawled to the bathroom. His daughter checked up on him and found that he was in an altered state. He was brought to the [facility] ED by the PRS. Prior to the transfer and the reason for the transfer was an abnormal  CT of the head. There was evidence for a bleed on his head CT. There was suspicion that he had had a fall. His Plavix was discontinued. He was seen by neurosurgery, Dr. Abdifatah Rahman, and there was no surgical intervention necessary. He had  subsequent follow-up with with Dr. Abdifatah Rahman and he was released. He continues to follow  with neurology.   His daughter reported that he was diagnosed with \"late onset Alzheimer's\". His daughter believes he is doing better since several of his sleep and anxiety meds were discontinued. He did have a fall recently. He apparently lost his footing in a flower bed and struck his head on a gas meter that was in the garden. He did not lose consciousness. He did require numerous staples to close his scalp wound. In the office today he reports no chest pain or shortness of breath. Encounter Diagnoses   Name Primary?  Atherosclerosis of native coronary artery of native heart without angina pectoris Yes    Stenosis of left carotid artery     Sinus bradycardia, persistent on low dose beta blockade     Dyslipidemia        Plan; in the office today, the patient has no specific complaints. He denies any chest pain or shortness of breath. Will continue hold  on Plavix. We will plan to see him back in 6 months. Will arrange for carotid studies to be repeated at that time. PCP:  Marleny Pollack MD       Past Medical History:   Diagnosis Date    Atherosclerotic heart disease     Blurred vision, left eye     resolution with cataract surgery    CAD (coronary artery disease)     Calculus of kidney     Carotid duplex ([facility] 12/17/2015    Mild < 50% JAMAL stenosis. Chronic LICA occlusion. Probable significant RECA stenosis. Unchanged from study of 2/9/15.  Carotid stenosis     Chronic kidney disease, stage IV (severe) ([address] 12/11/2012    BUN and Creatinine 28/2.58     CVA (cerebral vascular accident) ([address] 2/22/05 & 2/23/05    secondary to totally occluded left internal carotid artery    Diverticulosis     Equivocal nuclear cardiac imaging test 12/29/2015    Low-intermediate risk. Inferobasal artifact vs prior injury. Possible mild inferobasal hypk. EF 53%. Positive EKG on EST. Ex time 8 mins 43 secs.  History of echocardiogram 02/23/2005    EF 50-55%. Basal-mid inferior hypk.   Mild MR.    History of heart attack     Holter monitor, normal 2014    Benign 24-hour Holter study.  Hypercholesteremia     Ischemic heart disease     Myocardial infarction, inferior wall ([address]     S/P cardiac catheterization 2002    LM patent. pLAD 75%. pCx 35%. OM1 45%. dRCA 70%. EF 50%.  Stroke [facility]) 2005    Stroke ([facility] 2021    2nd stroke-Brain bleed(went to [facility], then went to [facility])    Vascular disease     Visceral arterial duplex 2012    No significant renal artery stenosis bilaterally. R kidney 10.4 cm. L kidney 10.1 cm. Bilateral intrinsic/med renal disease. Both renal veins patent.  Vitamin B 12 deficiency     with history of replacement         Past Surgical History:   Procedure Laterality Date    HX APPENDECTOMY      HX BACK SURGERY      L5- When he was in high school- [facility] - [place] CATARACT REMOVAL  2009    left    HX CATARACT REMOVAL  2012    right    HX CORONARY [address] [place] [address]    right coronary artery    HX HEART CATHETERIZATION  2002    HX HERNIA REPAIR      MD SPINE SURGERY PROCEDURE UNLISTED      spinal fusion       Current Outpatient Medications   Medication Sig    memantine (NAMENDA) 10 mg tablet Take 10 mg by mouth two (2) times a day.  mirtazapine (REMERON) 30 mg tablet Take 30 mg by mouth nightly.  atorvastatin (LIPITOR) 80 mg tablet TAKE 1 TABLET BY MOUTH EVERY DAY    Lactobacillus acidophilus (PROBIOTIC PO) Take  by mouth.  Cholecalciferol, Vitamin D3, 5,000 unit Tab Take 1 Tab by mouth daily.  co-enzyme Q-10 (CO Q-10) 100 mg capsule Take 100 mg by mouth daily. No current facility-administered medications for this visit.         No Known Allergies    Social   Social History     Tobacco Use    Smoking status: Never Smoker    Smokeless tobacco: Never Used   Substance Use Topics    Alcohol use: No         Family history: Father  of myocardial infarction at age 66, otherwise no family history of heart disease. Review of Systems:   Constitutional: Negative. Respiratory: Negative. Cardiovascular: Negative. Gastrointestinal: Negative. Musculoskeletal: Negative. Physical Exam:   The patient is an alert and oriented  Visit Vitals  /70 (BP 1 Location: Left upper arm, BP Patient Position: Sitting, BP Cuff Size: Adult)   Pulse (!) 59   Ht 6' 1\" (1.854 m)   Wt 63.5 kg (140 lb)   SpO2 99%   BMI 18.47 kg/m²      BP Readings from Last 3 Encounters:   05/03/22 104/70   11/23/21 136/88   08/23/21 (!) 102/58        Wt Readings from Last 3 Encounters:   05/03/22 63.5 kg (140 lb)   11/23/21 64.4 kg (142 lb)   08/23/21 64 kg (141 lb)       HEENT:  Conjuctiva white and mucosa moist  Neck: Supple without masses, tenderness or thyromegaly. No jugular venous distention. Right carotid has a bruit. Cardiovascular: Chest is symmetrical with good excursion. S1 and S2 are normal.  No, rubs, clicks or gallops. Lungs: Clear to auscultation in all fields. Abdomen: Soft. No masses, tenderness or organomegaly. Extremities: No edema, with full peripheral pulses. Review of Data:  Please refer to past medical history for most recent cardiac testing. Results for orders placed or performed in visit on 5/3/2022   AMB POC EKG ROUTINE W/ 12 LEADS, INTER & REP     Status: None    Narrative    Sinus bradycardia with first-degree AV block. LVH with secondary ST and T wave abnormalities. Cannot exclude septal wall myocardial infarction. No change compared to prior tracing. Tavon Simmons MD , F.A.C.C. [facility]  [facility] [place]  [address]. [address]    PLEASE NOTE:  This document has been produced using voice recognition software. Unrecognized errors in transcription may be present.

## 2022-11-01 ENCOUNTER — OFFICE VISIT (OUTPATIENT)
Dept: CARDIOLOGY CLINIC | Age: 82
End: 2022-11-01
Payer: MEDICARE

## 2022-11-01 VITALS
SYSTOLIC BLOOD PRESSURE: 130 MMHG | HEIGHT: 73 IN | OXYGEN SATURATION: 100 % | DIASTOLIC BLOOD PRESSURE: 60 MMHG | BODY MASS INDEX: 18.29 KG/M2 | HEART RATE: 64 BPM | WEIGHT: 138 LBS

## 2022-11-01 DIAGNOSIS — I25.10 ATHEROSCLEROSIS OF NATIVE CORONARY ARTERY OF NATIVE HEART WITHOUT ANGINA PECTORIS: Primary | ICD-10-CM

## 2022-11-01 PROCEDURE — 1123F ACP DISCUSS/DSCN MKR DOCD: CPT | Performed by: INTERNAL MEDICINE

## 2022-11-01 PROCEDURE — G8536 NO DOC ELDER MAL SCRN: HCPCS | Performed by: INTERNAL MEDICINE

## 2022-11-01 PROCEDURE — 99214 OFFICE O/P EST MOD 30 MIN: CPT | Performed by: INTERNAL MEDICINE

## 2022-11-01 PROCEDURE — 1101F PT FALLS ASSESS-DOCD LE1/YR: CPT | Performed by: INTERNAL MEDICINE

## 2022-11-01 PROCEDURE — G8432 DEP SCR NOT DOC, RNG: HCPCS | Performed by: INTERNAL MEDICINE

## 2022-11-01 PROCEDURE — G8427 DOCREV CUR MEDS BY ELIG CLIN: HCPCS | Performed by: INTERNAL MEDICINE

## 2022-11-01 PROCEDURE — G8419 CALC BMI OUT NRM PARAM NOF/U: HCPCS | Performed by: INTERNAL MEDICINE

## 2022-11-01 NOTE — PROGRESS NOTES
Patty Nyhan presents today for   Chief Complaint   Patient presents with    Follow-up     6 months        Patty Nyhan preferred language for health care discussion is english/other. Is someone accompanying this pt? no    Is the patient using any DME equipment during [address]? no    Depression Screening:  3 most recent PHQ Screens 5/3/2022   Little interest or pleasure in doing things Not at all   Feeling down, depressed, irritable, or hopeless Not at all   Total Score PHQ 2 0       Learning Assessment:  Learning Assessment 7/12/2016   PRIMARY LEARNER Patient   HIGHEST LEVEL OF EDUCATION - PRIMARY LEARNER  -   BARRIERS PRIMARY LEARNER -   CO-LEARNER CAREGIVER -   PRIMARY LANGUAGE ENGLISH   LEARNER PREFERENCE PRIMARY DEMONSTRATION   ANSWERED BY patient   RELATIONSHIP SELF       Abuse Screening:  Abuse Screening Questionnaire 8/23/2021   Do you ever feel afraid of your partner? N   Are you in a relationship with someone who physically or mentally threatens you? N   Is it safe for you to go home? Y       Fall Risk  Fall Risk Assessment, last 12 mths 5/3/2022   Able to walk? Yes   Fall in past 12 months? 0   Do you feel unsteady? 0   Are you worried about falling 0   Is the gait abnormal? -   Fall with injury? -       Pt currently taking Anticoagulant therapy? no    Coordination of Care:  1. Have you been to the ER, urgent care clinic since your last visit? Hospitalized since your last visit? no    2. Have you seen or consulted any other health care providers outside of the [address] [place] [address] since your last visit? Include any pap smears or colon screening.  no

## 2022-11-02 RX ORDER — ATORVASTATIN CALCIUM 80 MG/1
TABLET, FILM COATED ORAL
Qty: 90 TABLET | Refills: 3 | Status: SHIPPED | OUTPATIENT
Start: 2022-11-02

## 2022-11-08 NOTE — PROGRESS NOTES
Sergei Benítez is in the office today for cardiovascular reevaluation of his  ischemic heart disease, carotid disease, and hypercholesterolemia. He has a history of coronary artery disease with a prior inferior wall myocardial infarction  in 1998. He had a cardiac catheterization following an abnormal nuclear myocardial perfusion study  in 2002. The cardiac catheterization demonstratined a 75% proximal LAD lesion, a 35% circumflex obstruction, a 45% OM1 obstruction and a 70% right coronary artery obstruction with an ejection fraction of 70%. He was treated medically. His last nuclear stress test was in August 2018 and it was felt to be a low risk study. He has severe carotid disease and he had a carotid duplex study  completed on July 30, 2019. It demonstrated a totally obstructed left internal carotid artery and a moderate 50 to 69% right internal carotid artery stenosis. The duplex scan was unchanged from a prior study  in August 2018. The carotid study was repeated on 4/16/2021. There was moderate stenosis in the right ICA (50 to 69%) there was mild stenosis in the left ICA (less than 50%). Compared to the prior study, there was minimal flow detected in the left internal carotid artery. He had a  [facility] ED evaluation and subsequent transfer to [facility]. Prior to the evaluation, he became unable to walk. Leonor Cherry Log He crawled to the bathroom. His daughter checked up on him and found that he was in an altered state. He was brought to the [facility] ED by the PRS. Prior to the transfer and the reason for the transfer was an abnormal  CT of the head. There was evidence for a bleed on his head CT. There was suspicion that he had had a fall. His Plavix was discontinued. He was seen by neurosurgery, Dr. Serina Jackson, and there was no surgical intervention necessary. He had  subsequent follow-up with with Dr. Serina Jackson and he was released. He continues to follow  with neurology.   His daughter reported that he was diagnosed with \"late onset Alzheimer's\". His daughter believes he is doing better since several of his sleep and anxiety meds were discontinued. He did have a fall recently. He apparently lost his footing in a flower bed and struck his head on a gas meter that was in the garden. He did not lose consciousness. He did require numerous staples to close his scalp wound. In the office today reports no more recent falls. She is using a walker most of the time now. His weight is stable. He has had no increasing shortness of breath or swelling. He has had no chest pain          Plan; in the office today, the patient has no specific complaints. He denies any chest pain or shortness of breath. Will continue hold  on Plavix. We will plan to see him back in 6 months. PCP:  Denise Arevalo MD       Past Medical History:   Diagnosis Date    Atherosclerotic heart disease     Blurred vision, left eye     resolution with cataract surgery    CAD (coronary artery disease)     Calculus of kidney     Carotid duplex ([facility] 12/17/2015    Mild < 50% JAMAL stenosis. Chronic LICA occlusion. Probable significant RECA stenosis. Unchanged from study of 2/9/15. Carotid stenosis     Chronic kidney disease, stage IV (severe) ([address] 12/11/2012    BUN and Creatinine 28/2.58     CVA (cerebral vascular accident) ([address] 2/22/05 & 2/23/05    secondary to totally occluded left internal carotid artery    Diverticulosis     Equivocal nuclear cardiac imaging test 12/29/2015    Low-intermediate risk. Inferobasal artifact vs prior injury. Possible mild inferobasal hypk. EF 53%. Positive EKG on EST. Ex time 8 mins 43 secs. History of echocardiogram 02/23/2005    EF 50-55%. Basal-mid inferior hypk. Mild MR. History of heart attack     Holter monitor, normal 05/20/2014    Benign 24-hour Holter study.     Hypercholesteremia     Ischemic heart disease     Myocardial infarction, inferior wall (HCC) 1998    S/P cardiac catheterization 2002    LM patent. pLAD 75%. pCx 35%. OM1 45%. dRCA 70%. EF 50%. Stroke [facility]) 2005    Stroke ([address] 2021    2nd stroke-Brain bleed(went to [facility], then went to [facility])    Vascular disease     Visceral arterial duplex 2012    No significant renal artery stenosis bilaterally. R kidney 10.4 cm. L kidney 10.1 cm. Bilateral intrinsic/med renal disease. Both renal veins patent. Vitamin B 12 deficiency     with history of replacement         Past Surgical History:   Procedure Laterality Date    HX APPENDECTOMY      HX BACK SURGERY      L5- When he was in high school- [facility]    HX CATARACT REMOVAL  2009    left    HX CATARACT REMOVAL  2012    right    [address]    right coronary artery    HX HEART CATHETERIZATION  2002    HX HERNIA REPAIR      [place] SPINE SURGERY PROCEDURE UNLISTED      spinal fusion       Current Outpatient Medications   Medication Sig    memantine (NAMENDA) 10 mg tablet Take 10 mg by mouth two (2) times a day. mirtazapine (REMERON) 30 mg tablet Take 30 mg by mouth nightly. Lactobacillus acidophilus (PROBIOTIC PO) Take  by mouth. Cholecalciferol, Vitamin D3, 5,000 unit Tab Take 1 Tab by mouth daily. co-enzyme Q-10 (CO Q-10) 100 mg capsule Take 100 mg by mouth daily. atorvastatin (LIPITOR) 80 mg tablet TAKE 1 TABLET BY MOUTH EVERY DAY     No current facility-administered medications for this visit. No Known Allergies    Social   Social History     Tobacco Use    Smoking status: Never    Smokeless tobacco: Never   Substance Use Topics    Alcohol use: No         Family history: Father  of myocardial infarction at age 66, otherwise no family history of heart disease. Review of Systems:   Constitutional: Negative. Respiratory: Negative. Cardiovascular: Negative. Gastrointestinal: Negative. Musculoskeletal: Negative.           Physical Exam:   The patient is an alert and oriented  Visit Vitals  /60 (BP 1 Location: Right arm, BP Patient Position: Sitting, BP Cuff Size: Adult)   Pulse 64   Ht 6' 1\" (1.854 m)   Wt 62.6 kg (138 lb)   SpO2 100%   BMI 18.21 kg/m²      BP Readings from Last 3 Encounters:   11/01/22 130/60   05/03/22 104/70   11/23/21 136/88        Wt Readings from Last 3 Encounters:   11/01/22 62.6 kg (138 lb)   05/03/22 63.5 kg (140 lb)   11/23/21 64.4 kg (142 lb)       HEENT:  Conjuctiva white and mucosa moist  Neck: Supple without masses, tenderness or thyromegaly. No jugular venous distention. Right carotid has a bruit. Cardiovascular: Chest is symmetrical with good excursion. S1 and S2 are normal.  No, rubs, clicks or gallops. Lungs: Clear to auscultation in all fields. Abdomen: Soft. No masses, tenderness or organomegaly. Extremities: No edema, with full peripheral pulses. Review of Data:  Please refer to past medical history for most recent cardiac testing. Results for orders placed or performed in visit on 5/3/2022   AMB POC EKG ROUTINE W/ 12 LEADS, INTER & REP     Status: None    Narrative    Sinus bradycardia with first-degree AV block. LVH with secondary ST and T wave abnormalities. Cannot exclude septal wall myocardial infarction. No change compared to prior tracing. Soledad Silva MD    [facility]  Cardiovascular Specialists  [facility] [place]  [address] [place] [address]. [address]    PLEASE NOTE:  This document has been produced using voice recognition software. Unrecognized errors in transcription may be present.